# Patient Record
Sex: MALE | Race: WHITE | Employment: OTHER | URBAN - METROPOLITAN AREA
[De-identification: names, ages, dates, MRNs, and addresses within clinical notes are randomized per-mention and may not be internally consistent; named-entity substitution may affect disease eponyms.]

---

## 2018-12-01 ENCOUNTER — APPOINTMENT (OUTPATIENT)
Dept: GENERAL RADIOLOGY | Age: 68
End: 2018-12-01
Payer: MEDICARE

## 2018-12-01 ENCOUNTER — HOSPITAL ENCOUNTER (EMERGENCY)
Age: 68
Discharge: HOME | End: 2018-12-01
Payer: MEDICARE

## 2018-12-01 VITALS
OXYGEN SATURATION: 98 % | HEIGHT: 73 IN | HEART RATE: 68 BPM | DIASTOLIC BLOOD PRESSURE: 80 MMHG | WEIGHT: 232 LBS | SYSTOLIC BLOOD PRESSURE: 146 MMHG | RESPIRATION RATE: 14 BRPM | BODY MASS INDEX: 30.75 KG/M2 | TEMPERATURE: 98 F

## 2018-12-01 DIAGNOSIS — S43.401A SPRAIN OF RIGHT SHOULDER, UNSPECIFIED SHOULDER SPRAIN TYPE, INITIAL ENCOUNTER: Primary | ICD-10-CM

## 2018-12-01 PROCEDURE — 99283 EMERGENCY DEPT VISIT LOW MDM: CPT

## 2018-12-01 PROCEDURE — 73030 X-RAY EXAM OF SHOULDER: CPT

## 2018-12-01 PROCEDURE — 6370000000 HC RX 637 (ALT 250 FOR IP): Performed by: NURSE PRACTITIONER

## 2018-12-01 RX ORDER — PREDNISONE 20 MG/1
TABLET ORAL
Qty: 18 TABLET | Refills: 0 | Status: SHIPPED | OUTPATIENT
Start: 2018-12-01 | End: 2018-12-11

## 2018-12-01 RX ORDER — SIMVASTATIN 40 MG
40 TABLET ORAL NIGHTLY
COMMUNITY
End: 2021-02-01 | Stop reason: SDUPTHER

## 2018-12-01 RX ORDER — IBUPROFEN 800 MG/1
800 TABLET ORAL EVERY 6 HOURS PRN
Qty: 20 TABLET | Refills: 3 | Status: SHIPPED | OUTPATIENT
Start: 2018-12-01 | End: 2022-07-12

## 2018-12-01 RX ORDER — HYDROCODONE BITARTRATE AND ACETAMINOPHEN 5; 325 MG/1; MG/1
1 TABLET ORAL EVERY 6 HOURS PRN
Qty: 12 TABLET | Refills: 0 | Status: SHIPPED | OUTPATIENT
Start: 2018-12-01 | End: 2018-12-04

## 2018-12-01 RX ORDER — IBUPROFEN 800 MG/1
800 TABLET ORAL ONCE
Status: COMPLETED | OUTPATIENT
Start: 2018-12-01 | End: 2018-12-01

## 2018-12-01 RX ORDER — MELOXICAM 10 MG/1
CAPSULE ORAL
COMMUNITY
End: 2021-02-01 | Stop reason: SDUPTHER

## 2018-12-01 RX ADMIN — IBUPROFEN 800 MG: 800 TABLET, FILM COATED ORAL at 11:24

## 2018-12-01 ASSESSMENT — PAIN DESCRIPTION - LOCATION: LOCATION: SHOULDER

## 2018-12-01 ASSESSMENT — PAIN DESCRIPTION - PAIN TYPE: TYPE: ACUTE PAIN

## 2018-12-01 ASSESSMENT — PAIN DESCRIPTION - ORIENTATION: ORIENTATION: RIGHT

## 2018-12-01 ASSESSMENT — PAIN SCALES - GENERAL
PAINLEVEL_OUTOF10: 9
PAINLEVEL_OUTOF10: 9
PAINLEVEL_OUTOF10: 7

## 2018-12-01 ASSESSMENT — PAIN DESCRIPTION - DESCRIPTORS: DESCRIPTORS: CONSTANT;DISCOMFORT

## 2019-12-23 LAB
AVERAGE GLUCOSE: 114
CHOLESTEROL, TOTAL: 178 MG/DL
CHOLESTEROL/HDL RATIO: 3.4
CREATININE: 0.8 MG/DL
HBA1C MFR BLD: 5.6 %
HDLC SERPL-MCNC: 53 MG/DL (ref 35–70)
LDL CHOLESTEROL CALCULATED: 109 MG/DL (ref 0–160)
POTASSIUM (K+): 4.2
TRIGL SERPL-MCNC: 82 MG/DL
VLDLC SERPL CALC-MCNC: 16 MG/DL

## 2020-03-12 VITALS — SYSTOLIC BLOOD PRESSURE: 102 MMHG | DIASTOLIC BLOOD PRESSURE: 60 MMHG | RESPIRATION RATE: 12 BRPM | HEART RATE: 72 BPM

## 2020-03-12 RX ORDER — TERBINAFINE HYDROCHLORIDE 250 MG/1
250 TABLET ORAL DAILY
COMMUNITY
End: 2021-06-16

## 2020-05-04 ENCOUNTER — HOSPITAL ENCOUNTER (OUTPATIENT)
Age: 70
Discharge: HOME | End: 2020-05-06
Payer: MEDICARE

## 2020-05-04 LAB
ALBUMIN SERPL-MCNC: 4.6 G/DL (ref 3.5–5.2)
ALP BLD-CCNC: 63 U/L (ref 40–129)
ALT SERPL-CCNC: 15 U/L (ref 0–40)
ANION GAP SERPL CALCULATED.3IONS-SCNC: 14 MMOL/L (ref 7–16)
AST SERPL-CCNC: 22 U/L (ref 0–39)
BASOPHILS ABSOLUTE: 0.03 E9/L (ref 0–0.2)
BASOPHILS RELATIVE PERCENT: 0.6 % (ref 0–2)
BILIRUB SERPL-MCNC: 0.9 MG/DL (ref 0–1.2)
BUN BLDV-MCNC: 20 MG/DL (ref 8–23)
CALCIUM SERPL-MCNC: 10 MG/DL (ref 8.6–10.2)
CHLORIDE BLD-SCNC: 103 MMOL/L (ref 98–107)
CHOLESTEROL, TOTAL: 178 MG/DL (ref 0–199)
CO2: 26 MMOL/L (ref 22–29)
CREAT SERPL-MCNC: 0.9 MG/DL (ref 0.7–1.2)
EOSINOPHILS ABSOLUTE: 0.22 E9/L (ref 0.05–0.5)
EOSINOPHILS RELATIVE PERCENT: 4.3 % (ref 0–6)
GFR AFRICAN AMERICAN: >60
GFR NON-AFRICAN AMERICAN: >60 ML/MIN/1.73
GLUCOSE BLD-MCNC: 97 MG/DL (ref 74–99)
HCT VFR BLD CALC: 48.7 % (ref 37–54)
HDLC SERPL-MCNC: 54 MG/DL
HEMOGLOBIN: 16.1 G/DL (ref 12.5–16.5)
IMMATURE GRANULOCYTES #: 0.01 E9/L
IMMATURE GRANULOCYTES %: 0.2 % (ref 0–5)
LDL CHOLESTEROL CALCULATED: 110 MG/DL (ref 0–99)
LYMPHOCYTES ABSOLUTE: 1.18 E9/L (ref 1.5–4)
LYMPHOCYTES RELATIVE PERCENT: 23 % (ref 20–42)
MCH RBC QN AUTO: 29.8 PG (ref 26–35)
MCHC RBC AUTO-ENTMCNC: 33.1 % (ref 32–34.5)
MCV RBC AUTO: 90 FL (ref 80–99.9)
MONOCYTES ABSOLUTE: 0.44 E9/L (ref 0.1–0.95)
MONOCYTES RELATIVE PERCENT: 8.6 % (ref 2–12)
NEUTROPHILS ABSOLUTE: 3.25 E9/L (ref 1.8–7.3)
NEUTROPHILS RELATIVE PERCENT: 63.3 % (ref 43–80)
PDW BLD-RTO: 12.3 FL (ref 11.5–15)
PLATELET # BLD: 206 E9/L (ref 130–450)
PMV BLD AUTO: 11 FL (ref 7–12)
POTASSIUM SERPL-SCNC: 4.7 MMOL/L (ref 3.5–5)
PROSTATE SPECIFIC ANTIGEN: <0.01 NG/ML (ref 0–4)
RBC # BLD: 5.41 E12/L (ref 3.8–5.8)
SODIUM BLD-SCNC: 143 MMOL/L (ref 132–146)
TOTAL PROTEIN: 8 G/DL (ref 6.4–8.3)
TRIGL SERPL-MCNC: 69 MG/DL (ref 0–149)
VLDLC SERPL CALC-MCNC: 14 MG/DL
WBC # BLD: 5.1 E9/L (ref 4.5–11.5)

## 2020-05-04 PROCEDURE — 84153 ASSAY OF PSA TOTAL: CPT

## 2020-05-04 PROCEDURE — 80053 COMPREHEN METABOLIC PANEL: CPT

## 2020-05-04 PROCEDURE — 36415 COLL VENOUS BLD VENIPUNCTURE: CPT

## 2020-05-04 PROCEDURE — 80061 LIPID PANEL: CPT

## 2020-05-04 PROCEDURE — 85025 COMPLETE CBC W/AUTO DIFF WBC: CPT

## 2020-06-04 VITALS
HEART RATE: 70 BPM | BODY MASS INDEX: 30.61 KG/M2 | TEMPERATURE: 96.4 F | SYSTOLIC BLOOD PRESSURE: 110 MMHG | WEIGHT: 232 LBS | RESPIRATION RATE: 14 BRPM | DIASTOLIC BLOOD PRESSURE: 72 MMHG

## 2020-08-31 VITALS
SYSTOLIC BLOOD PRESSURE: 127 MMHG | RESPIRATION RATE: 14 BRPM | TEMPERATURE: 95.9 F | DIASTOLIC BLOOD PRESSURE: 70 MMHG | BODY MASS INDEX: 30.34 KG/M2 | WEIGHT: 230 LBS | HEART RATE: 64 BPM

## 2020-10-14 VITALS
HEART RATE: 72 BPM | SYSTOLIC BLOOD PRESSURE: 120 MMHG | TEMPERATURE: 96.6 F | BODY MASS INDEX: 31.14 KG/M2 | DIASTOLIC BLOOD PRESSURE: 72 MMHG | WEIGHT: 236 LBS | RESPIRATION RATE: 14 BRPM

## 2020-12-23 ENCOUNTER — TELEPHONE (OUTPATIENT)
Dept: FAMILY MEDICINE CLINIC | Age: 70
End: 2020-12-23

## 2020-12-23 ENCOUNTER — TELEPHONE (OUTPATIENT)
Dept: ADMINISTRATIVE | Age: 70
End: 2020-12-23

## 2020-12-23 NOTE — TELEPHONE ENCOUNTER
Pt would like a call from the office concerning bw that he will need for an upcoming appt in [facility] [employer] .  Please call Pt

## 2020-12-30 DIAGNOSIS — C61 PROSTATE CANCER (HCC): ICD-10-CM

## 2020-12-30 LAB — PROSTATE SPECIFIC ANTIGEN: <0.03 NG/ML (ref 0–4)

## 2021-02-01 RX ORDER — SIMVASTATIN 40 MG
40 TABLET ORAL NIGHTLY
Qty: 90 TABLET | Refills: 1 | Status: SHIPPED
Start: 2021-02-01 | End: 2021-07-30 | Stop reason: SDUPTHER

## 2021-02-01 RX ORDER — MELOXICAM 10 MG/1
15 CAPSULE ORAL DAILY
Qty: 90 CAPSULE | Refills: 1 | Status: SHIPPED | OUTPATIENT
Start: 2021-02-01 | End: 2022-07-12 | Stop reason: SDUPTHER

## 2021-06-16 ENCOUNTER — OFFICE VISIT (OUTPATIENT)
Dept: FAMILY MEDICINE CLINIC | Age: 71
End: 2021-06-16
Payer: MEDICARE

## 2021-06-16 VITALS
WEIGHT: 230 LBS | HEART RATE: 64 BPM | SYSTOLIC BLOOD PRESSURE: 130 MMHG | BODY MASS INDEX: 30.48 KG/M2 | DIASTOLIC BLOOD PRESSURE: 80 MMHG | OXYGEN SATURATION: 95 % | HEIGHT: 73 IN

## 2021-06-16 DIAGNOSIS — R73.9 HYPERGLYCEMIA: ICD-10-CM

## 2021-06-16 DIAGNOSIS — E78.5 HYPERLIPIDEMIA, UNSPECIFIED HYPERLIPIDEMIA TYPE: ICD-10-CM

## 2021-06-16 DIAGNOSIS — M79.18 GLUTEAL PAIN: ICD-10-CM

## 2021-06-16 DIAGNOSIS — M25.551 HIP PAIN, ACUTE, RIGHT: Primary | ICD-10-CM

## 2021-06-16 DIAGNOSIS — Z85.46 HISTORY OF PROSTATE CANCER: ICD-10-CM

## 2021-06-16 DIAGNOSIS — C43.9 MALIGNANT MELANOMA, UNSPECIFIED SITE (HCC): ICD-10-CM

## 2021-06-16 PROCEDURE — 1123F ACP DISCUSS/DSCN MKR DOCD: CPT | Performed by: INTERNAL MEDICINE

## 2021-06-16 PROCEDURE — 4004F PT TOBACCO SCREEN RCVD TLK: CPT | Performed by: INTERNAL MEDICINE

## 2021-06-16 PROCEDURE — 99213 OFFICE O/P EST LOW 20 MIN: CPT | Performed by: INTERNAL MEDICINE

## 2021-06-16 PROCEDURE — 4040F PNEUMOC VAC/ADMIN/RCVD: CPT | Performed by: INTERNAL MEDICINE

## 2021-06-16 PROCEDURE — G8417 CALC BMI ABV UP PARAM F/U: HCPCS | Performed by: INTERNAL MEDICINE

## 2021-06-16 PROCEDURE — 3017F COLORECTAL CA SCREEN DOC REV: CPT | Performed by: INTERNAL MEDICINE

## 2021-06-16 PROCEDURE — G8427 DOCREV CUR MEDS BY ELIG CLIN: HCPCS | Performed by: INTERNAL MEDICINE

## 2021-06-16 RX ORDER — METHYLPREDNISOLONE 4 MG/1
TABLET ORAL
Qty: 1 KIT | Refills: 0 | Status: SHIPPED | OUTPATIENT
Start: 2021-06-16 | End: 2021-06-22

## 2021-06-16 SDOH — ECONOMIC STABILITY: FOOD INSECURITY: WITHIN THE PAST 12 MONTHS, YOU WORRIED THAT YOUR FOOD WOULD RUN OUT BEFORE YOU GOT MONEY TO BUY MORE.: NEVER TRUE

## 2021-06-16 SDOH — ECONOMIC STABILITY: FOOD INSECURITY: WITHIN THE PAST 12 MONTHS, THE FOOD YOU BOUGHT JUST DIDN'T LAST AND YOU DIDN'T HAVE MONEY TO GET MORE.: NEVER TRUE

## 2021-06-16 ASSESSMENT — PATIENT HEALTH QUESTIONNAIRE - PHQ9
SUM OF ALL RESPONSES TO PHQ QUESTIONS 1-9: 0
2. FEELING DOWN, DEPRESSED OR HOPELESS: 0
SUM OF ALL RESPONSES TO PHQ QUESTIONS 1-9: 0
SUM OF ALL RESPONSES TO PHQ QUESTIONS 1-9: 0
SUM OF ALL RESPONSES TO PHQ9 QUESTIONS 1 & 2: 0
1. LITTLE INTEREST OR PLEASURE IN DOING THINGS: 0

## 2021-06-16 ASSESSMENT — SOCIAL DETERMINANTS OF HEALTH (SDOH): HOW HARD IS IT FOR YOU TO PAY FOR THE VERY BASICS LIKE FOOD, HOUSING, MEDICAL CARE, AND HEATING?: NOT HARD AT ALL

## 2021-06-16 NOTE — PROGRESS NOTES
Subjective:     Chief Complaint   Patient presents with    Melanoma   Patient here complaining of pain in the right gluteal area for the past 6 months, it hurts to sit, sitting in the chair he cannot sit for more than 20 minutes, driving a car after sometimes gets uncomfortable, pain is mostly localized to the right gluteal area it does not radiate to the leg  Denies any groin pain      Recently he was diagnosed with melanoma he underwent surgery in the right scapular area now he follows at [facility] he sees Dr. Tio Ponce  For the melanoma  He had a CT chest and CT abdomen pelvis in January 2021    Getting another CAT scan May 2021      Has generalized arthritis    Has history of prostate cancer    Past Medical History:   Diagnosis Date    Allergic rhinitis     Cancer ([facility]     Gout     Hyperlipidemia     Onychomycosis     Osteoarthritis     Prostate CA ([facility]     PVC (premature ventricular contraction)     Varicose vein of leg         Social History     Socioeconomic History    Marital status:      Spouse name: Not on file    Number of children: Not on file    Years of education: Not on file    Highest education level: Not on file   Occupational History    Not on file   Tobacco Use    Smoking status: Never Smoker    Smokeless tobacco: Never Used   Substance and Sexual Activity    Alcohol use: No    Drug use: No    Sexual activity: Not on file   Other Topics Concern    Not on file   Social History Narrative    Not on file     Social Determinants of Health     Financial Resource Strain: Low Risk     Difficulty of Paying Living Expenses: Not hard at all   Food Insecurity: No Food Insecurity    Worried About [address] [facility] in the Last Year: Never true    [address]  in the Last Year: Never true   Transportation Needs:     Lack of Transportation (Medical):      Lack of Transportation (Non-Medical):    Physical Activity:     Days of Exercise per Week:     Minutes of Exercise per Session:    Stress:     Feeling of Stress :    Social Connections:     Frequency of Communication with Friends and Family:     Frequency of Social Gatherings with Friends and Family:     Attends [employer] Services:     Active Member of Clubs or Organizations:     Attends Club or Organization Meetings:     Marital Status:    Intimate Partner Violence:     Fear of Current or Ex-Partner:     Emotionally Abused:     Physically Abused:     Sexually Abused:         Past Surgical History:   Procedure Laterality Date   [facility]    Dr. Radha Willingham     COLONOSCOPY  2009    another in  Oct. 2017    HERNIA REPAIR      KNEE ARTHROPLASTY Bilateral     LUMBAR LAMINECTOMY      L4    TONSILLECTOMY          Family History   Problem Relation Age of Onset    Breast Cancer Mother     Arthritis Father     High Blood Pressure Father     No Known Problems Sister         Allergies   Allergen Reactions    Sulfa Antibiotics     Tetanus Toxoid Other (See Comments)     Horse serum        ROS  No acute distress  Cardiac: Denies any chest pain or palpitation  Stress test 2004 -ve  Denies any exertional angina physically very active  Respiratory: Denies any cough or shortness of breath  CT chest January 2021 [facility]  GI: No abdominal pain. Denies any nausea vomiting or diarrhea  Colonoscopy October 2017  CT abdomen pelvis January 2021 -  : Denies any dysuria frequency or hematuria  History of prostate cancer not following with any urologist at this time  Neuro: No headache or dizziness  Mild peripheral neuropathy stable has been seen by neurologist  Endocrine: No diabetes  Skin: normal  No recent weight gain or weight loss  Denies any change in vision    Objective:    /80   Pulse 64   Ht 6' 1\" (1.854 m)   Wt 230 lb (104.3 kg)   SpO2 95%   BMI 30.34 kg/m²     Constitutional: Alert awake and oriented  Eyes: Pupils equal bilaterally.  Extraocular muscles intact  Neck: no JVD adenopathy no bruit  Heart:  RRR, no murmurs, gallops, or rubs. Lungs:    no wheeze, rales or rhonchi  Abd: bowel sounds present, nontender, nondistended, no masses  Extrem:  No clubbing, cyanosis, or edema  Neuro: AAOx3,No Focal deficit  Psychological: no depression or anxiety   He had laminectomy in 1989 L4-L5  No bony tenderness  Reproducible tenderness near the right greater trochanter    Current Outpatient Medications   Medication Sig Dispense Refill    methylPREDNISolone (MEDROL DOSEPACK) 4 MG tablet Take by mouth. 1 kit 0    simvastatin (ZOCOR) 40 MG tablet Take 1 tablet by mouth nightly 90 tablet 1    Meloxicam 10 MG CAPS Take 15 mg/day by mouth daily 90 capsule 1    terbinafine (LAMISIL) 250 MG tablet Take 250 mg by mouth daily (Patient not taking: Reported on 6/16/2021)      ibuprofen (ADVIL;MOTRIN) 800 MG tablet Take 1 tablet by mouth every 6 hours as needed for Pain 20 tablet 3     No current facility-administered medications for this visit.         Last 3 BMP  Lab Results   Component Value Date/Time     12/29/2020 09:52 AM     05/04/2020 10:05 AM     03/25/2016 09:44 AM    K 4.5 12/29/2020 09:52 AM    K 4.7 05/04/2020 10:05 AM    K 4.2 12/23/2019 12:00 AM    K 4.3 03/25/2016 09:44 AM     12/29/2020 09:52 AM     05/04/2020 10:05 AM     03/25/2016 09:44 AM    CO2 26 05/04/2020 10:05 AM    CO2 25 03/25/2016 09:44 AM    CO2 26 07/22/2015 09:16 AM    BUN 20 05/04/2020 10:05 AM    BUN 20 03/25/2016 09:44 AM    BUN 21 07/22/2015 09:16 AM    CREATININE 0.6 05/20/2021 09:46 AM    CREATININE 0.74 05/20/2021 09:16 AM    CREATININE 0.84 12/29/2020 09:52 AM    CREATININE 0.9 05/04/2020 10:05 AM    CREATININE 0.8 12/23/2019 12:00 AM    GLUCOSE 96 12/29/2020 09:52 AM    GLUCOSE 97 05/04/2020 10:05 AM    GLUCOSE 99 03/25/2016 09:44 AM    GLUCOSE 98 07/22/2015 09:16 AM    CALCIUM 9.6 12/29/2020 09:52 AM    CALCIUM 10.0 05/04/2020 10:05 AM    CALCIUM 9.2 03/25/2016 09:44 AM Last 3 CMP:    Lab Results   Component Value Date/Time     12/29/2020 09:52 AM     05/04/2020 10:05 AM     03/25/2016 09:44 AM    K 4.5 12/29/2020 09:52 AM    K 4.7 05/04/2020 10:05 AM    K 4.2 12/23/2019 12:00 AM    K 4.3 03/25/2016 09:44 AM     12/29/2020 09:52 AM     05/04/2020 10:05 AM     03/25/2016 09:44 AM    CO2 26 05/04/2020 10:05 AM    CO2 25 03/25/2016 09:44 AM    CO2 26 07/22/2015 09:16 AM    BUN 20 05/04/2020 10:05 AM    BUN 20 03/25/2016 09:44 AM    BUN 21 07/22/2015 09:16 AM    CREATININE 0.6 05/20/2021 09:46 AM    CREATININE 0.74 05/20/2021 09:16 AM    CREATININE 0.84 12/29/2020 09:52 AM    CREATININE 0.9 05/04/2020 10:05 AM    CREATININE 0.8 12/23/2019 12:00 AM    GLUCOSE 96 12/29/2020 09:52 AM    GLUCOSE 97 05/04/2020 10:05 AM    GLUCOSE 99 03/25/2016 09:44 AM    GLUCOSE 98 07/22/2015 09:16 AM    CALCIUM 9.6 12/29/2020 09:52 AM    CALCIUM 10.0 05/04/2020 10:05 AM    CALCIUM 9.2 03/25/2016 09:44 AM    PROT 8.0 05/04/2020 10:05 AM    PROT 7.3 03/25/2016 09:44 AM    PROT 7.4 07/22/2015 09:16 AM    LABALBU 4.6 05/04/2020 10:05 AM    LABALBU 4.1 03/25/2016 09:44 AM    LABALBU 4.2 07/22/2015 09:16 AM    LABALBU 4.4 12/12/2011 08:49 AM    BILITOT 0.9 05/04/2020 10:05 AM    BILITOT 0.7 03/25/2016 09:44 AM    BILITOT 0.6 07/22/2015 09:16 AM    ALKPHOS 63 05/04/2020 10:05 AM    ALKPHOS 54 03/25/2016 09:44 AM    ALKPHOS 52 07/22/2015 09:16 AM    AST 22 05/04/2020 10:05 AM    AST 21 03/25/2016 09:44 AM    AST 21 07/22/2015 09:16 AM    ALT 15 05/04/2020 10:05 AM    ALT 13 03/25/2016 09:44 AM    ALT 14 07/22/2015 09:16 AM        CBC:   Lab Results   Component Value Date/Time    WBC 5.1 12/29/2020 09:52 AM    WBC 5.1 05/04/2020 10:05 AM    RBC 5.19 12/29/2020 09:52 AM    HGB 15.8 12/29/2020 09:52 AM    HCT 45.1 12/29/2020 09:52 AM    MCV 87 12/29/2020 09:52 AM    MCH 29.8 05/04/2020 10:05 AM    MCHC 35.0 12/29/2020 09:52 AM    RDW 12.3 05/04/2020 10:05 AM     12/29/2020 09:52 AM    MPV 11.0 05/04/2020 10:05 AM       A1C:  Lab Results   Component Value Date/Time    LABA1C 5.6 12/23/2019 12:00 AM       Lipid panel:  Lab Results   Component Value Date    CHOL 178 05/04/2020    CHOL 178 12/23/2019    CHOL 236 03/25/2016    TRIG 69 05/04/2020    TRIG 82 12/23/2019    TRIG 94 03/25/2016    HDL 54 05/04/2020    HDL 53 12/23/2019    HDL 51 03/25/2016        Lab Results   Component Value Date/Time    PROT 8.0 05/04/2020 10:05 AM    PROT 7.3 03/25/2016 09:44 AM    PROT 7.4 07/22/2015 09:16 AM    INR 1.3 (H) 12/29/2020 09:52 AM       No results found for: MG      [facility]. Diagnoses and all orders for this visit:    Hip pain, acute, right  -     XR PELVIS (1-2 VIEWS); Future  -     XR HIP RIGHT (2-3 VIEWS); Future  -     CBC WITH AUTO DIFFERENTIAL; Future  -     PSA, DIAGNOSTIC; Future    Gluteal pain  -     XR PELVIS (1-2 VIEWS); Future  -     XR HIP RIGHT (2-3 VIEWS); Future  -     CBC WITH AUTO DIFFERENTIAL; Future  -     PSA, DIAGNOSTIC; Future    History of prostate cancer  -     XR PELVIS (1-2 VIEWS); Future  -     XR HIP RIGHT (2-3 VIEWS); Future  -     CBC WITH AUTO DIFFERENTIAL; Future  -     PSA, DIAGNOSTIC; Future    Hyperlipidemia, unspecified hyperlipidemia type  -     COMPREHENSIVE METABOLIC PANEL; Future  -     LIPID PANEL; Future  -     TSH; Future    Hyperglycemia  -     HEMOGLOBIN A1C; Future    Malignant melanoma, unspecified site ([address]    Other orders  -     methylPREDNISolone (MEDROL DOSEPACK) 4 MG tablet; Take by mouth.        Patient Active Problem List   Diagnosis    Sprain of shoulder, right    Malignant melanoma ([address]    History of prostate cancer    Hyperlipidemia       Plan: Right hip pain and gluteal pain x-ray of the pelvis of the right hip    Medrol Dosepak for possible bursitis, if no improvement see an orthopedic surgeon he will see 1 in [facility] he will discuss with his doctor in [place] who

## 2021-07-30 RX ORDER — MELOXICAM 15 MG/1
15 TABLET ORAL DAILY PRN
Qty: 90 TABLET | Refills: 1 | Status: SHIPPED
Start: 2021-07-30 | End: 2022-01-03 | Stop reason: SDUPTHER

## 2021-07-30 RX ORDER — SIMVASTATIN 40 MG
40 TABLET ORAL NIGHTLY
Qty: 90 TABLET | Refills: 3 | Status: SHIPPED | OUTPATIENT
Start: 2021-07-30 | End: 2022-05-31 | Stop reason: SDUPTHER

## 2021-10-04 ENCOUNTER — OFFICE VISIT (OUTPATIENT)
Dept: FAMILY MEDICINE CLINIC | Age: 71
End: 2021-10-04
Payer: MEDICARE

## 2021-10-04 VITALS
WEIGHT: 233 LBS | HEART RATE: 71 BPM | DIASTOLIC BLOOD PRESSURE: 80 MMHG | OXYGEN SATURATION: 98 % | HEIGHT: 73 IN | TEMPERATURE: 97.3 F | BODY MASS INDEX: 30.88 KG/M2 | SYSTOLIC BLOOD PRESSURE: 128 MMHG

## 2021-10-04 DIAGNOSIS — D72.819 LEUKOPENIA, UNSPECIFIED TYPE: ICD-10-CM

## 2021-10-04 DIAGNOSIS — Z12.11 COLON CANCER SCREENING: ICD-10-CM

## 2021-10-04 DIAGNOSIS — M19.90 INFLAMMATORY ARTHRITIS: ICD-10-CM

## 2021-10-04 DIAGNOSIS — Z23 NEED FOR INFLUENZA VACCINATION: ICD-10-CM

## 2021-10-04 DIAGNOSIS — C43.9 MALIGNANT MELANOMA, UNSPECIFIED SITE (HCC): ICD-10-CM

## 2021-10-04 DIAGNOSIS — Z00.00 ROUTINE GENERAL MEDICAL EXAMINATION AT A HEALTH CARE FACILITY: Primary | ICD-10-CM

## 2021-10-04 DIAGNOSIS — C61 PROSTATE CANCER (HCC): ICD-10-CM

## 2021-10-04 DIAGNOSIS — E78.5 HYPERLIPIDEMIA, UNSPECIFIED HYPERLIPIDEMIA TYPE: ICD-10-CM

## 2021-10-04 PROCEDURE — G0008 ADMIN INFLUENZA VIRUS VAC: HCPCS | Performed by: INTERNAL MEDICINE

## 2021-10-04 PROCEDURE — G8427 DOCREV CUR MEDS BY ELIG CLIN: HCPCS | Performed by: INTERNAL MEDICINE

## 2021-10-04 PROCEDURE — 4040F PNEUMOC VAC/ADMIN/RCVD: CPT | Performed by: INTERNAL MEDICINE

## 2021-10-04 PROCEDURE — 90694 VACC AIIV4 NO PRSRV 0.5ML IM: CPT | Performed by: INTERNAL MEDICINE

## 2021-10-04 PROCEDURE — G8484 FLU IMMUNIZE NO ADMIN: HCPCS | Performed by: INTERNAL MEDICINE

## 2021-10-04 PROCEDURE — 1123F ACP DISCUSS/DSCN MKR DOCD: CPT | Performed by: INTERNAL MEDICINE

## 2021-10-04 PROCEDURE — 4004F PT TOBACCO SCREEN RCVD TLK: CPT | Performed by: INTERNAL MEDICINE

## 2021-10-04 PROCEDURE — G0438 PPPS, INITIAL VISIT: HCPCS | Performed by: INTERNAL MEDICINE

## 2021-10-04 PROCEDURE — G8417 CALC BMI ABV UP PARAM F/U: HCPCS | Performed by: INTERNAL MEDICINE

## 2021-10-04 PROCEDURE — 3017F COLORECTAL CA SCREEN DOC REV: CPT | Performed by: INTERNAL MEDICINE

## 2021-10-04 PROCEDURE — 99214 OFFICE O/P EST MOD 30 MIN: CPT | Performed by: INTERNAL MEDICINE

## 2021-10-04 ASSESSMENT — LIFESTYLE VARIABLES
AUDIT TOTAL SCORE: INCOMPLETE
HOW OFTEN DO YOU HAVE A DRINK CONTAINING ALCOHOL: 0
AUDIT-C TOTAL SCORE: INCOMPLETE
HOW OFTEN DO YOU HAVE A DRINK CONTAINING ALCOHOL: NEVER

## 2021-10-04 ASSESSMENT — PATIENT HEALTH QUESTIONNAIRE - PHQ9
SUM OF ALL RESPONSES TO PHQ9 QUESTIONS 1 & 2: 0
SUM OF ALL RESPONSES TO PHQ QUESTIONS 1-9: 0
2. FEELING DOWN, DEPRESSED OR HOPELESS: 0
SUM OF ALL RESPONSES TO PHQ QUESTIONS 1-9: 0
SUM OF ALL RESPONSES TO PHQ QUESTIONS 1-9: 0
1. LITTLE INTEREST OR PLEASURE IN DOING THINGS: 0

## 2021-10-04 NOTE — PATIENT INSTRUCTIONS
Personalized Preventive Plan for Chuck Marsh - 10/4/2021  Medicare offers a range of preventive health benefits. Some of the tests and screenings are paid in full while other may be subject to a deductible, co-insurance, and/or copay. Some of these benefits include a comprehensive review of your medical history including lifestyle, illnesses that may run in your family, and various assessments and screenings as appropriate. After reviewing your medical record and screening and assessments performed today your provider may have ordered immunizations, labs, imaging, and/or referrals for you. A list of these orders (if applicable) as well as your Preventive Care list are included within your After Visit Summary for your review. Other Preventive Recommendations:    · A preventive eye exam performed by an eye specialist is recommended every 1-2 years to screen for glaucoma; cataracts, macular degeneration, and other eye disorders. · A preventive dental visit is recommended every 6 months. · Try to get at least 150 minutes of exercise per week or 10,000 steps per day on a pedometer . · Order or download the FREE \"Exercise & Physical Activity: Your Everyday Guide\" from [employer]. Call 4-673.539.6319 or search [employer] online. · You need 8216-0108 mg of calcium and 9578-0428 IU of vitamin D per day. It is possible to meet your calcium requirement with diet alone, but a vitamin D supplement is usually necessary to meet this goal.  · When exposed to the sun, use a sunscreen that protects against both UVA and UVB radiation with an SPF of 30 or greater. Reapply every 2 to 3 hours or after sweating, drying off with a towel, or swimming. · Always wear a seat belt when traveling in a car. Always wear a helmet when riding a bicycle or motorcycle.

## 2021-10-04 NOTE — PROGRESS NOTES
Medicare Annual Wellness Visit  Name: Mae Moses Date: 10/4/2021   MRN: <[id number]> Sex: Male   Age: 73 y.o. Ethnicity: Non- / Non    : 1950 Race: White (non-)      Rick Sinha is here for Annual Exam (Medicare)    Screenings for behavioral, psychosocial and functional/safety risks, and cognitive dysfunction are all negative except as indicated below. These results, as well as other patient data from the [address] form, are documented in Flowsheets linked to this Encounter. Allergies   Allergen Reactions    Sulfa Antibiotics     Tetanus Toxoid Other (See Comments)     Horse serum         Prior to Visit Medications    Medication Sig Taking?  Authorizing Provider   meloxicam (MOBIC) 15 MG tablet Take 1 tablet by mouth daily as needed for Pain Yes Genoveva Araujo MD   simvastatin (ZOCOR) 40 MG tablet Take 1 tablet by mouth nightly Yes Genoveva Araujo MD   Meloxicam 10 MG CAPS Take 15 mg/day by mouth daily Yes Genoveva Araujo MD   ibuprofen (ADVIL;MOTRIN) 800 MG tablet Take 1 tablet by mouth every 6 hours as needed for Pain  Lopez Reed, APRN - CNP         Past Medical History:   Diagnosis Date    Allergic rhinitis     Cancer [facility] [address]     Gout     Hyperlipidemia     Onychomycosis     Osteoarthritis     Prostate CA ([facility] [address]     PVC (premature ventricular contraction)     Varicose vein of leg        Past Surgical History:   Procedure Laterality Date   [facility]    Dr. Tyson Morton     COLONOSCOPY      another in  Oct. 2017    HERNIA REPAIR      KNEE ARTHROPLASTY Bilateral     LUMBAR LAMINECTOMY      L4    TONSILLECTOMY           Family History   Problem Relation Age of Onset    Breast Cancer Mother     Arthritis Father     High Blood Pressure Father     No Known Problems Sister        CareTeam (Including outside providers/suppliers regularly involved in providing care):   Patient Care Team:  Genoveva Araujo MD as PCP - General (Internal Medicine)  Jose Armando Pineda MD as PCP - [facility] Empaneled Provider    Wt Readings from Last 3 Encounters:   10/04/21 233 lb (105.7 kg)   06/16/21 230 lb (104.3 kg)   10/08/20 236 lb (107 kg)     Vitals:    10/04/21 1113   BP: 128/80   Pulse: 71   Temp: 97.3 °F (36.3 °C)   SpO2: 98%   Weight: 233 lb (105.7 kg)   Height: 6' 1\" (1.854 m)     Body mass index is 30.74 kg/m². Based upon direct observation of the patient, evaluation of cognition reveals recent and remote memory intact. See detailed physical examination    Patient's complete Health Risk Assessment and screening values have been reviewed and are found in Flowsheets. The following problems were reviewed today and where indicated follow up appointments were made and/or referrals ordered. Positive Risk Factor Screenings with Interventions:            General Health and ACP:  General  In general, how would you say your health is?: Good  In the past 7 days, have you experienced any of the following?  New or Increased Pain, New or Increased Fatigue, Loneliness, Social Isolation, Stress or Anger?: None of These  Do you get the social and emotional support that you need?: Yes  Do you have a Living Will?: Yes  Advance Directives     Power of [address] [place] [address] Will ACP-Advance Directive ACP-Power of     Not on File Not on File Not on File Not on File      General Health Risk Interventions:  · Generalized arthritis pain, no depression anxiety, feels well, active, plays golf, plays racquetball,    Health Habits/Nutrition:  Health Habits/Nutrition  Do you exercise for at least 20 minutes 2-3 times per week?: Yes  Have you lost any weight without trying in the past 3 months?: No  Do you eat only one meal per day?: No  Have you seen the dentist within the past year?: Yes  Body mass index: (!) 30.74  Health Habits/Nutrition Interventions:  · Physically active, plays racquetball, plays golf,       Personalized Preventive Plan   Current Health Maintenance Status  Immunization History   Administered Date(s) Administered    COVID-19, Pfizer, PF, 30mcg/0.3mL 02/02/2021, 02/23/2021    Influenza, High Dose (Fluzone 65 yrs and older) 10/01/2020    Influenza, Quadv, adjuvanted, 65 yrs +, IM, PF (Fluad) 09/11/2020, 10/04/2021    Pneumococcal Conjugate 7-valent (Tio Magana) 1950    Zoster Live (Zostavax) 04/12/2013        Health Maintenance   Topic Date Due    Hepatitis C screen  Never done    Shingles Vaccine (2 of 3) 06/07/2013    Pneumococcal 65+ years Vaccine (1 of 1 - PPSV23) 08/09/2017    Annual Wellness Visit (AWV)  Never done    Lipid screen  09/29/2022    PSA counseling  09/29/2022    Diabetes screen  09/29/2024    Colon cancer screen colonoscopy  10/30/2027    Flu vaccine  Completed    COVID-19 Vaccine  Completed    Hepatitis A vaccine  Aged Out    Hepatitis B vaccine  Aged Out    Hib vaccine  Aged Out    Meningococcal (ACWY) vaccine  Aged Out     Recommendations for [employer] Due: see orders and patient instructions/AVS.  . Recommended screening schedule for the next 5-10 years is provided to the patient in written form: see Patient Instructions/AVS.    Jose A Talbot was seen today for annual exam.    Diagnoses and all orders for this visit:    Routine general medical examination at a health care facility    Malignant melanoma, unspecified site [facility])    Need for influenza vaccination  -     INFLUENZA, QUADV, ADJUVANTED, 72 YRS =, IM, PF, PREFILL SYR, 0.5ML (FLUAD)    Leukopenia, unspecified type  -     CBC WITH AUTO DIFFERENTIAL; Future    Prostate cancer ([facility] [address]  -     CBC WITH AUTO DIFFERENTIAL; Future    Inflammatory arthritis  -     CBC WITH AUTO DIFFERENTIAL; Future  -     C-REACTIVE PROTEIN; Future  -     RHEUMATOID FACTOR; Future  -     ROCIO; Future  -     Sedimentation Rate; Future    Hyperlipidemia, unspecified hyperlipidemia type  -     COMPREHENSIVE METABOLIC PANEL; Future  -     LIPID PANEL;  Future Subjective:     Chief Complaint   Patient presents with    Annual Exam     Medicare   Patient is here for wellness physical examination, complains of generalized hand pains, stiff in the morning,  He was checked for arthritis profile few years ago which was negative, she has been taking meloxicam with good help,    She has history of melanoma now, which is being followed by a specialist surgeon was to hospital,  He had a CT abdomen pelvis and CT chest May 2021 was okay,    History of prostate cancer has been stable his PSA has been good      Follow-up on the cholesterol, a complete physical performed    His pain in the right hip is improved, x-ray of the pelvis and hip in June 2021 was negative    Past Medical History:   Diagnosis Date    Allergic rhinitis     Cancer [facility] [address]     Gout     Hyperlipidemia     Onychomycosis     Osteoarthritis     Prostate CA ([facility] [address]     PVC (premature ventricular contraction)     Varicose vein of leg         Social History     Socioeconomic History    Marital status:      Spouse name: Not on file    Number of children: Not on file    Years of education: Not on file    Highest education level: Not on file   Occupational History    Not on file   Tobacco Use    Smoking status: Never Smoker    Smokeless tobacco: Never Used   Substance and Sexual Activity    Alcohol use: No    Drug use: No    Sexual activity: Not on file   Other Topics Concern    Not on file   Social History Narrative    Not on file     Social Determinants of Health     Financial Resource Strain: Low Risk     Difficulty of Paying Living Expenses: Not hard at all   Food Insecurity: No Food Insecurity    Worried About [address] in the Last Year: Never true    [address]  in the Last Year: Never true   Transportation Needs:     Lack of Transportation (Medical):      Lack of Transportation (Non-Medical):    Physical Activity:     Days of Exercise per Week:     Minutes of Exercise per Session:    Stress:     Feeling of Stress :    Social Connections:     Frequency of Communication with Friends and Family:     Frequency of Social Gatherings with Friends and Family:     Attends [employer] Services:     Active Member of Clubs or Organizations:     Attends Club or Organization Meetings:     Marital Status:    Intimate Partner Violence:     Fear of Current or Ex-Partner:     Emotionally Abused:     Physically Abused:     Sexually Abused:         Past Surgical History:   Procedure Laterality Date   Nikki Bautista     COLONOSCOPY  2009    another in  Oct. 2017    HERNIA REPAIR      KNEE ARTHROPLASTY Bilateral     LUMBAR LAMINECTOMY      L4    TONSILLECTOMY          Family History   Problem Relation Age of Onset    Breast Cancer Mother     Arthritis Father     High Blood Pressure Father     No Known Problems Sister         Allergies   Allergen Reactions    Sulfa Antibiotics     Tetanus Toxoid Other (See Comments)     Horse serum        ROS  No acute distress  Cardiac: Denies any chest pain or palpitation  Stress test 2004 -  He plays racquetball 2 hours/day 3 to 4 days a week without any angina  Plays golf regularly without any dyspnea or angina  Respiratory: Denies any cough or shortness of breath  GI: No abdominal pain.  Denies any nausea vomiting or diarrhea  CT abdomen pelvis May 2021 [facility] negative  Colonoscopy October 2017 by Dr. Chela Chavira  He recommended follow-up in 10 years unless symptomatic  Patient will check with him for follow-up colonoscopy  : Denies any dysuria frequency or hematuria  History of prostate cancer  He does not go to urologist anymore  Monitoring his PSA PSA at this noted  Neuro: No headache or dizziness  History of mild peripheral neuropathy has been seen by neurologist stable no worsening of symptoms  Endocrine: No diabetes  Skin: normal  No recent weight gain or weight loss  Denies any change in vision    Objective:    /80   Pulse 71   Temp 97.3 °F (36.3 °C)   Ht 6' 1\" (1.854 m)   Wt 233 lb (105.7 kg)   SpO2 98%   BMI 30.74 kg/m²     Constitutional: Alert awake and oriented  Eyes: Pupils equal bilaterally. Extraocular muscles intact  Neck: no JVD adenopathy no bruit  Heart:  RRR, no murmurs, gallops, or rubs. Lungs:    no wheeze, rales or rhonchi  Abd: bowel sounds present, nontender, nondistended, no masses  Extrem:  No clubbing, cyanosis, or edema  Neuro: AAOx3,No Focal deficit  Psychological: no depression or anxiety   Rectal exam no masses stool Hemoccult negative  Had laminectomy in 1989 L4-L5  Had melanoma excision    Current Outpatient Medications   Medication Sig Dispense Refill    meloxicam (MOBIC) 15 MG tablet Take 1 tablet by mouth daily as needed for Pain 90 tablet 1    simvastatin (ZOCOR) 40 MG tablet Take 1 tablet by mouth nightly 90 tablet 3    Meloxicam 10 MG CAPS Take 15 mg/day by mouth daily 90 capsule 1    ibuprofen (ADVIL;MOTRIN) 800 MG tablet Take 1 tablet by mouth every 6 hours as needed for Pain 20 tablet 3     No current facility-administered medications for this visit.         Last 3 BMP  Lab Results   Component Value Date/Time     09/29/2021 08:20 AM     12/29/2020 09:52 AM     05/04/2020 10:05 AM    K 4.7 09/29/2021 08:20 AM    K 4.5 12/29/2020 09:52 AM    K 4.7 05/04/2020 10:05 AM     09/29/2021 08:20 AM     12/29/2020 09:52 AM     05/04/2020 10:05 AM    CO2 23 09/29/2021 08:20 AM    CO2 26 05/04/2020 10:05 AM    CO2 25 03/25/2016 09:44 AM    BUN 21 09/29/2021 08:20 AM    BUN 20 05/04/2020 10:05 AM    BUN 20 03/25/2016 09:44 AM    CREATININE 0.8 09/29/2021 08:20 AM    CREATININE 0.6 05/20/2021 09:46 AM    CREATININE 0.74 05/20/2021 09:16 AM    CREATININE 0.84 12/29/2020 09:52 AM    CREATININE 0.8 12/23/2019 12:00 AM    GLUCOSE 106 (H) 09/29/2021 08:20 AM    GLUCOSE 96 12/29/2020 09:52 AM    GLUCOSE 97 05/04/2020 10:05 AM GLUCOSE 99 03/25/2016 09:44 AM    CALCIUM 9.7 09/29/2021 08:20 AM    CALCIUM 9.6 12/29/2020 09:52 AM    CALCIUM 10.0 05/04/2020 10:05 AM       Last 3 CMP:    Lab Results   Component Value Date/Time     09/29/2021 08:20 AM     12/29/2020 09:52 AM     05/04/2020 10:05 AM    K 4.7 09/29/2021 08:20 AM    K 4.5 12/29/2020 09:52 AM    K 4.7 05/04/2020 10:05 AM     09/29/2021 08:20 AM     12/29/2020 09:52 AM     05/04/2020 10:05 AM    CO2 23 09/29/2021 08:20 AM    CO2 26 05/04/2020 10:05 AM    CO2 25 03/25/2016 09:44 AM    BUN 21 09/29/2021 08:20 AM    BUN 20 05/04/2020 10:05 AM    BUN 20 03/25/2016 09:44 AM    CREATININE 0.8 09/29/2021 08:20 AM    CREATININE 0.6 05/20/2021 09:46 AM    CREATININE 0.74 05/20/2021 09:16 AM    CREATININE 0.84 12/29/2020 09:52 AM    CREATININE 0.8 12/23/2019 12:00 AM    GLUCOSE 106 (H) 09/29/2021 08:20 AM    GLUCOSE 96 12/29/2020 09:52 AM    GLUCOSE 97 05/04/2020 10:05 AM    GLUCOSE 99 03/25/2016 09:44 AM    CALCIUM 9.7 09/29/2021 08:20 AM    CALCIUM 9.6 12/29/2020 09:52 AM    CALCIUM 10.0 05/04/2020 10:05 AM    PROT 7.0 09/29/2021 08:20 AM    PROT 8.0 05/04/2020 10:05 AM    PROT 7.3 03/25/2016 09:44 AM    LABALBU 4.4 09/29/2021 08:20 AM    LABALBU 4.6 05/04/2020 10:05 AM    LABALBU 4.1 03/25/2016 09:44 AM    LABALBU 4.4 12/12/2011 08:49 AM    BILITOT 0.5 09/29/2021 08:20 AM    BILITOT 0.9 05/04/2020 10:05 AM    BILITOT 0.7 03/25/2016 09:44 AM    ALKPHOS 62 09/29/2021 08:20 AM    ALKPHOS 63 05/04/2020 10:05 AM    ALKPHOS 54 03/25/2016 09:44 AM    AST 25 09/29/2021 08:20 AM    AST 22 05/04/2020 10:05 AM    AST 21 03/25/2016 09:44 AM    ALT 17 09/29/2021 08:20 AM    ALT 15 05/04/2020 10:05 AM    ALT 13 03/25/2016 09:44 AM        CBC:   Lab Results   Component Value Date/Time    WBC 4.4 (L) 09/29/2021 08:20 AM    RBC 5.06 09/29/2021 08:20 AM    HGB 15.4 09/29/2021 08:20 AM    HCT 45.9 09/29/2021 08:20 AM    MCV 90.7 09/29/2021 08:20 AM    MCH 30.4 09/29/2021 08:20 AM    MCHC 33.6 09/29/2021 08:20 AM    RDW 12.5 09/29/2021 08:20 AM     09/29/2021 08:20 AM    MPV 10.8 09/29/2021 08:20 AM       A1C:  Lab Results   Component Value Date/Time    LABA1C 5.6 09/29/2021 08:20 AM       Lipid panel:  Lab Results   Component Value Date    CHOL 189 09/29/2021    CHOL 178 05/04/2020    CHOL 178 12/23/2019    TRIG 139 09/29/2021    TRIG 69 05/04/2020    TRIG 82 12/23/2019    HDL 48 09/29/2021    HDL 54 05/04/2020    HDL 53 12/23/2019        Lab Results   Component Value Date/Time    PROT 7.0 09/29/2021 08:20 AM    PROT 8.0 05/04/2020 10:05 AM    PROT 7.3 03/25/2016 09:44 AM    INR 1.3 (H) 12/29/2020 09:52 AM       No results found for: MG      Assessment. Conor Billing was seen today for annual exam.    Diagnoses and all orders for this visit:    Routine general medical examination at a health care facility    Malignant melanoma, unspecified site [facility])    Need for influenza vaccination  -     INFLUENZA, QUADV, ADJUVANTED, 72 YRS =, IM, PF, PREFILL SYR, 0.5ML (FLUAD)    Leukopenia, unspecified type  -     CBC WITH AUTO DIFFERENTIAL; Future    Prostate cancer ([facility] [address]  -     CBC WITH AUTO DIFFERENTIAL; Future    Inflammatory arthritis  -     CBC WITH AUTO DIFFERENTIAL; Future  -     C-REACTIVE PROTEIN; Future  -     RHEUMATOID FACTOR; Future  -     ROCIO; Future  -     Sedimentation Rate; Future    Hyperlipidemia, unspecified hyperlipidemia type  -     COMPREHENSIVE METABOLIC PANEL; Future  -     LIPID PANEL;  Future       Patient Active Problem List   Diagnosis    Sprain of shoulder, right    Malignant melanoma ([facility] [address]    History of prostate cancer    Hyperlipidemia       Plan: General physical examination patient clinically doing very well    Malignant melanoma being followed by a specialist at [facility] he follows with him twice a year    Leukopenia WBC four-point 4 repeat CBC patient asymptomatic    History of prostate cancer stable PSA less than 0.03 no change from the last PSA    Possible inflammatory arthritis versus osteoarthritis in the hands check rheumatoid factor, sed rate, ROCIO, CRP,  Continue Mobic 15 mg daily which does help, risk of the medication discussed, monitor liver enzyme kidney function    Hyperlipidemia  should be less than 100 patient could not tolerate Lipitor severe muscle pains  He will do a better job with the diet continue simvastatin 40 mg could not increase the dose because of muscle cramps    Flu vaccine was given    Return in 7 months (on 5/4/2022) for Medicare Annual Wellness Visit in 1 year.        Tomas Pickett MD  12:42 PM  10/4/2021     [place]

## 2022-01-03 RX ORDER — MELOXICAM 15 MG/1
15 TABLET ORAL DAILY PRN
Qty: 90 TABLET | Refills: 1 | Status: SHIPPED | OUTPATIENT
Start: 2022-01-03 | End: 2022-05-31 | Stop reason: SDUPTHER

## 2022-05-31 ENCOUNTER — OFFICE VISIT (OUTPATIENT)
Dept: FAMILY MEDICINE CLINIC | Age: 72
End: 2022-05-31
Payer: MEDICARE

## 2022-05-31 VITALS
HEIGHT: 73 IN | OXYGEN SATURATION: 96 % | BODY MASS INDEX: 29.82 KG/M2 | HEART RATE: 66 BPM | RESPIRATION RATE: 18 BRPM | DIASTOLIC BLOOD PRESSURE: 78 MMHG | WEIGHT: 225 LBS | TEMPERATURE: 97.6 F | SYSTOLIC BLOOD PRESSURE: 122 MMHG

## 2022-05-31 DIAGNOSIS — Z76.89 ENCOUNTER TO ESTABLISH CARE WITH NEW DOCTOR: Primary | ICD-10-CM

## 2022-05-31 DIAGNOSIS — C61 PROSTATE CANCER (HCC): ICD-10-CM

## 2022-05-31 DIAGNOSIS — M25.532 BILATERAL WRIST PAIN: ICD-10-CM

## 2022-05-31 DIAGNOSIS — Z23 NEED FOR PNEUMOCOCCAL VACCINATION: ICD-10-CM

## 2022-05-31 DIAGNOSIS — M25.531 BILATERAL WRIST PAIN: ICD-10-CM

## 2022-05-31 DIAGNOSIS — E78.5 HYPERLIPIDEMIA, UNSPECIFIED HYPERLIPIDEMIA TYPE: ICD-10-CM

## 2022-05-31 DIAGNOSIS — L29.9 ITCHING OF EAR: ICD-10-CM

## 2022-05-31 DIAGNOSIS — M25.551 HIP PAIN, ACUTE, RIGHT: ICD-10-CM

## 2022-05-31 PROCEDURE — 90732 PPSV23 VACC 2 YRS+ SUBQ/IM: CPT | Performed by: FAMILY MEDICINE

## 2022-05-31 PROCEDURE — 99213 OFFICE O/P EST LOW 20 MIN: CPT | Performed by: FAMILY MEDICINE

## 2022-05-31 PROCEDURE — 3017F COLORECTAL CA SCREEN DOC REV: CPT | Performed by: FAMILY MEDICINE

## 2022-05-31 PROCEDURE — G8427 DOCREV CUR MEDS BY ELIG CLIN: HCPCS | Performed by: FAMILY MEDICINE

## 2022-05-31 PROCEDURE — 1036F TOBACCO NON-USER: CPT | Performed by: FAMILY MEDICINE

## 2022-05-31 PROCEDURE — G0009 ADMIN PNEUMOCOCCAL VACCINE: HCPCS | Performed by: FAMILY MEDICINE

## 2022-05-31 PROCEDURE — 1123F ACP DISCUSS/DSCN MKR DOCD: CPT | Performed by: FAMILY MEDICINE

## 2022-05-31 PROCEDURE — G8417 CALC BMI ABV UP PARAM F/U: HCPCS | Performed by: FAMILY MEDICINE

## 2022-05-31 RX ORDER — SIMVASTATIN 40 MG
40 TABLET ORAL NIGHTLY
Qty: 90 TABLET | Refills: 3 | Status: SHIPPED | OUTPATIENT
Start: 2022-05-31 | End: 2023-05-31

## 2022-05-31 RX ORDER — MELOXICAM 15 MG/1
15 TABLET ORAL DAILY PRN
Qty: 90 TABLET | Refills: 3 | Status: SHIPPED | OUTPATIENT
Start: 2022-05-31 | End: 2023-05-31

## 2022-05-31 RX ORDER — DOCUSATE SODIUM 250 MG
250 CAPSULE ORAL DAILY
COMMUNITY

## 2022-05-31 ASSESSMENT — ENCOUNTER SYMPTOMS
COUGH: 0
NAUSEA: 0
CONSTIPATION: 0
WHEEZING: 0
BLOOD IN STOOL: 0
SORE THROAT: 0
VOMITING: 0
BACK PAIN: 0
ABDOMINAL PAIN: 0
SHORTNESS OF BREATH: 0
DIARRHEA: 0

## 2022-05-31 ASSESSMENT — PATIENT HEALTH QUESTIONNAIRE - PHQ9
1. LITTLE INTEREST OR PLEASURE IN DOING THINGS: 0
SUM OF ALL RESPONSES TO PHQ9 QUESTIONS 1 & 2: 0
SUM OF ALL RESPONSES TO PHQ QUESTIONS 1-9: 0
2. FEELING DOWN, DEPRESSED OR HOPELESS: 0
SUM OF ALL RESPONSES TO PHQ QUESTIONS 1-9: 0

## 2022-05-31 NOTE — PROGRESS NOTES
Steffen Rizzo is a 73 y.o. male who presents today for     Chief Complaint   Patient presents with   [facility] Patient     dr Cayetano Diego patient , bi lateral hand pain, right hurts the worst, middle finger hurts the worst,  ears itchs alot, was given medication by dr Cayetano Diego and still itched.  Hip Pain     right hip pain        Chief concerns include stiffness and pain of bilateral hands    Stiffness and Pain, bilateral hands: Onset: longstanding. Location: bilateral hands and wrists. Injury: N/A. Description: aching and tightness. Duration: constant but can increase/decrease in intensity. Intensity: moderately severe. Exacerbated by: not using hands. Relieved by: movement/use. Associated symptoms: N/A  Medication/therapy trial: Meloxicam 15 mg/day. No previous diagnosis/treatment CTS. Plays sports that require gripping (ie golf) and holding paddle (pickleball and racquet ball)    Itching, Ears:  Longstanding. Topical medication do not provide relief. He has been to scratch his ears intensely without relief. No previous diagnosis/treatment of allergies. Further history: he reports that he always itches. .. anywhere    Prostate Cancer:  S/P prostatectomy 2016. Stopped following with specialist 2020; PCP has been following PSA since then. [address]:  Patient's past medical, surgical, social and/or family history reviewed, updated in chart, and are non-contributory (unless otherwise stated). Medications and allergies also reviewed and updated in chart. Review of Systems  Review of Systems   HENT: Negative for congestion, ear pain and sore throat. Respiratory: Negative for cough, shortness of breath and wheezing. Cardiovascular: Negative for chest pain, palpitations and leg swelling. Gastrointestinal: Negative for abdominal pain, blood in stool, constipation, diarrhea, nausea and vomiting. Genitourinary: Negative for dysuria, frequency, hematuria and urgency.    Musculoskeletal: Negative for back pain, myalgias and neck pain. Skin: Negative for rash. Neurological: Negative for dizziness, weakness and headaches. Psychiatric/Behavioral: The patient is not nervous/anxious. Physical Exam:    VS:  There were no vitals taken for this visit. LAST WEIGHT:  Wt Readings from Last 3 Encounters:   10/04/21 233 lb (105.7 kg)   06/16/21 230 lb (104.3 kg)   10/08/20 236 lb (107 kg)       BMI Readings from Last 3 Encounters:   10/04/21 30.74 kg/m²   06/16/21 30.34 kg/m²   10/08/20 31.14 kg/m²       Physical Exam  Constitutional:       General: He is not in acute distress. Appearance: He is well-developed. He is not diaphoretic. HENT:      Head: Normocephalic and atraumatic. Right Ear: External ear normal.      Left Ear: External ear normal.      Mouth/Throat:      Pharynx: No oropharyngeal exudate. Eyes:      General: No scleral icterus. Right eye: No discharge. Conjunctiva/sclera: Conjunctivae normal.      Pupils: Pupils are equal, round, and reactive to light. Neck:      Thyroid: No thyromegaly. Cardiovascular:      Rate and Rhythm: Normal rate and regular rhythm. Heart sounds: Normal heart sounds. No murmur heard. Pulmonary:      Effort: Pulmonary effort is normal. No respiratory distress. Breath sounds: No stridor. No wheezing or rales. Chest:      Chest wall: No tenderness. Abdominal:      General: Bowel sounds are normal. There is no distension. Palpations: Abdomen is soft. There is no mass. Tenderness: There is no abdominal tenderness. There is no guarding. Musculoskeletal:         General: No tenderness. Normal range of motion. Cervical back: Normal range of motion and neck supple. Lymphadenopathy:      Cervical: No cervical adenopathy. Skin:     General: Skin is warm and dry. Coloration: Skin is not pale. Findings: No erythema or rash.    Neurological:      Mental Status: He is alert and oriented to person, place, and time. Psychiatric:         Behavior: Behavior normal.         Thought Content: Thought content normal.         Labs:  Lab Results   Component Value Date     09/29/2021    K 4.7 09/29/2021     09/29/2021    CO2 23 09/29/2021    BUN 21 09/29/2021    CREATININE 0.89 12/07/2021    CREATININE 0.8 12/23/2019    PROT 7.0 09/29/2021    LABALBU 4.4 09/29/2021    LABALBU 4.4 12/12/2011    CALCIUM 9.7 09/29/2021    GFRAA >60 12/07/2021    LABGLOM >60 12/07/2021    GLUCOSE 106 09/29/2021    GLUCOSE 96 12/29/2020    AST 25 09/29/2021    ALT 17 09/29/2021    ALKPHOS 62 09/29/2021    BILITOT 0.5 09/29/2021    TSH 1.950 09/29/2021    CHOL 189 09/29/2021    TRIG 139 09/29/2021    HDL 48 09/29/2021    LDLCALC 113 09/29/2021    LABA1C 5.6 09/29/2021        Lab Results   Component Value Date    CHOL 189 09/29/2021    CHOL 178 05/04/2020    CHOL 178 12/23/2019     Lab Results   Component Value Date    TRIG 139 09/29/2021    TRIG 69 05/04/2020    TRIG 82 12/23/2019     Lab Results   Component Value Date    HDL 48 09/29/2021    HDL 54 05/04/2020    HDL 53 12/23/2019     Lab Results   Component Value Date    LDLCALC 113 (H) 09/29/2021    LDLCALC 110 (H) 05/04/2020    LDLCALC 109 12/23/2019       Lab Results   Component Value Date    LABA1C 5.6 09/29/2021    LABA1C 5.6 12/23/2019    LABA1C 5.6 03/25/2016     Lab Results   Component Value Date    LDLCALC 113 (H) 09/29/2021    CREATININE 0.89 12/07/2021           Assessment / Plan:      Juno Wolf was seen today for new patient and hip pain. Diagnoses and all orders for this visit:    Encounter to establish care with new doctor    Bilateral wrist pain: Differential diagnosis may include bilateral carpal tunnel. Shared decision making with patient and PCP agreed to conservative measures at this time. -     diclofenac sodium (VOLTAREN) 1 % GEL;  Apply 2 g topically 4 times daily  -     [facility]    Itching of ear: Patient was offered a variety of treatments, including p.o. medication, topical medication, and ENT referral; he declines all of these. Prostate cancer [facility]): Due for PSA screening    Hyperlipidemia, unspecified hyperlipidemia type: Medication refill  -     simvastatin (ZOCOR) 40 MG tablet; Take 1 tablet by mouth nightly    Hip pain, acute, right: Stable well-controlled. Medication refill  -     meloxicam (MOBIC) 15 MG tablet; Take 1 tablet by mouth daily as needed for Pain    Need for pneumococcal vaccination  -     Pneumococcal polysaccharide vaccine 23-valent greater than or equal to 3yo subcutaneous/IM        Follow Up:  Return 1) F/U 6 weeks to assess response of symptoms (wrist pain) to new treatment, for 2) Schedule Medicare AWV on or after 10/5/22. or sooner if necessary. Call or go to ED immediately if symptoms worsen or persist.    Educational materials and/or home exercises printed for patient's review and were included in patient instructions on his/her AfterVisit Summary and given to patient at the end of visit. Counseled regarding above diagnosis,including possible risks and complications,  especially if left uncontrolled. Counseled regarding the possible side effects, risks, benefits and alternatives to treatment; patient and/or guardian verbalizes understanding, agrees, feels comfortable with and wishes to proceed with above treatment plan. Advised patient tocall with any new medication issues, and read all Rx info from pharmacy to assureaware of all possible risks and side effects of medication before taking. Reviewed age and gender appropriate health screening exams and vaccinations. Advisedpatient regarding importance of keeping up with recommended health maintenance andto schedule as soon as possible if overdue, as this is important in assessing forundiagnosed pathology, especially cancer, as well as protecting against potentially harmful/life threatening disease.       Patient and/or guardian verbalizes understandingand agrees with above counseling, assessment and plan. All questions answered.     Dominic Barrera MD on 5/31/22

## 2022-07-12 ENCOUNTER — OFFICE VISIT (OUTPATIENT)
Dept: FAMILY MEDICINE CLINIC | Age: 72
End: 2022-07-12
Payer: MEDICARE

## 2022-07-12 ENCOUNTER — TELEPHONE (OUTPATIENT)
Dept: FAMILY MEDICINE CLINIC | Age: 72
End: 2022-07-12

## 2022-07-12 VITALS
WEIGHT: 226 LBS | OXYGEN SATURATION: 95 % | SYSTOLIC BLOOD PRESSURE: 120 MMHG | TEMPERATURE: 96.8 F | RESPIRATION RATE: 16 BRPM | BODY MASS INDEX: 29.95 KG/M2 | DIASTOLIC BLOOD PRESSURE: 68 MMHG | HEART RATE: 55 BPM | HEIGHT: 73 IN

## 2022-07-12 DIAGNOSIS — M25.531 BILATERAL WRIST PAIN: Primary | ICD-10-CM

## 2022-07-12 DIAGNOSIS — M25.532 BILATERAL WRIST PAIN: Primary | ICD-10-CM

## 2022-07-12 DIAGNOSIS — R53.1 WEAKNESS: ICD-10-CM

## 2022-07-12 DIAGNOSIS — M79.2 RADICULAR PAIN IN RIGHT ARM: ICD-10-CM

## 2022-07-12 PROCEDURE — G8417 CALC BMI ABV UP PARAM F/U: HCPCS | Performed by: FAMILY MEDICINE

## 2022-07-12 PROCEDURE — 3017F COLORECTAL CA SCREEN DOC REV: CPT | Performed by: FAMILY MEDICINE

## 2022-07-12 PROCEDURE — G8427 DOCREV CUR MEDS BY ELIG CLIN: HCPCS | Performed by: FAMILY MEDICINE

## 2022-07-12 PROCEDURE — 1036F TOBACCO NON-USER: CPT | Performed by: FAMILY MEDICINE

## 2022-07-12 PROCEDURE — 1123F ACP DISCUSS/DSCN MKR DOCD: CPT | Performed by: FAMILY MEDICINE

## 2022-07-12 PROCEDURE — 99213 OFFICE O/P EST LOW 20 MIN: CPT | Performed by: FAMILY MEDICINE

## 2022-07-12 RX ORDER — MELOXICAM 15 MG/1
15 TABLET ORAL DAILY PRN
Qty: 90 TABLET | Refills: 3 | Status: CANCELLED | OUTPATIENT
Start: 2022-07-12 | End: 2023-07-12

## 2022-07-12 SDOH — ECONOMIC STABILITY: FOOD INSECURITY: WITHIN THE PAST 12 MONTHS, THE FOOD YOU BOUGHT JUST DIDN'T LAST AND YOU DIDN'T HAVE MONEY TO GET MORE.: NEVER TRUE

## 2022-07-12 SDOH — ECONOMIC STABILITY: FOOD INSECURITY: WITHIN THE PAST 12 MONTHS, YOU WORRIED THAT YOUR FOOD WOULD RUN OUT BEFORE YOU GOT MONEY TO BUY MORE.: NEVER TRUE

## 2022-07-12 ASSESSMENT — ENCOUNTER SYMPTOMS
SHORTNESS OF BREATH: 0
CONSTIPATION: 0
NAUSEA: 0
COUGH: 0
BACK PAIN: 0
DIARRHEA: 0
WHEEZING: 0
VOMITING: 0
BLOOD IN STOOL: 0
ABDOMINAL PAIN: 0
SORE THROAT: 0

## 2022-07-12 ASSESSMENT — LIFESTYLE VARIABLES: HOW OFTEN DO YOU HAVE A DRINK CONTAINING ALCOHOL: NEVER

## 2022-07-12 ASSESSMENT — SOCIAL DETERMINANTS OF HEALTH (SDOH): HOW HARD IS IT FOR YOU TO PAY FOR THE VERY BASICS LIKE FOOD, HOUSING, MEDICAL CARE, AND HEATING?: NOT HARD AT ALL

## 2022-07-12 NOTE — TELEPHONE ENCOUNTER
Patient would like to have a different referral for OT. [facility] does not have any openings and wants patient to go to [facility],  Patient does not want to go to [facility], wants to stay in the [place] area. [facility].

## 2022-07-12 NOTE — PROGRESS NOTES
Oli Kirk is a 73 y.o. male who presents today for     Chief Complaint   Patient presents with    Follow-up     was never call for the wrist therapy, now getting tingling up and down right arm       Stiffness and Pain, bilateral hands: Onset: longstanding. Location: bilateral hands and wrists. Injury: N/A. Description: aching and tightness. Duration: constant but can increase/decrease in intensity. Intensity: moderately severe. Exacerbated by: not using hands. Relieved by: movement/use. Associated symptoms: N/A  Medication/therapy trial: Meloxicam 15 mg/day. No previous diagnosis/treatment CTS. Plays sports that require gripping (ie golf) and holding paddle (pickleball and racquet ball)    ASSESSMENT/PLAN, 5/31/22:    Bilateral wrist pain: Differential diagnosis may include bilateral carpal tunnel. Shared decision making with patient and PCP agreed to conservative measures at this time. -     diclofenac sodium (VOLTAREN) 1 % GEL; Apply 2 g topically 4 times daily  -     [address], [place]    Follow Up:  Return 1) F/U 6 weeks to assess response of symptoms (wrist pain) to new treatment    CURRENT STATUS (07/12/22): Reports that he was never contacted by OT to schedule, so OT never happened. Diclofenac gel was ineffective; can't tell if meloxicam 15 mg PO is helping. [address]:  Patient's past medical, surgical, social and/or family history reviewed, updated in chart, and are non-contributory (unless otherwise stated). Medications and allergies also reviewed and updated in chart. Review of Systems  Review of Systems   HENT: Negative for congestion, ear pain and sore throat. Respiratory: Negative for cough, shortness of breath and wheezing. Cardiovascular: Negative for chest pain, palpitations and leg swelling. Gastrointestinal: Negative for abdominal pain, blood in stool, constipation, diarrhea, nausea and vomiting.    Genitourinary: Negative for dysuria, frequency, hematuria and urgency. Musculoskeletal: Negative for back pain, myalgias and neck pain. Skin: Negative for rash. Neurological: Negative for dizziness, weakness and headaches. Psychiatric/Behavioral: The patient is not nervous/anxious. Physical Exam:    VS:  /68   Pulse 55   Temp 96.8 °F (36 °C) (Infrared)   Resp 16   Ht 6' 1\" (1.854 m)   Wt 226 lb (102.5 kg)   SpO2 95%   BMI 29.82 kg/m²     LAST WEIGHT:  Wt Readings from Last 3 Encounters:   07/12/22 226 lb (102.5 kg)   05/31/22 225 lb (102.1 kg)   10/04/21 233 lb (105.7 kg)       BMI Readings from Last 3 Encounters:   07/12/22 29.82 kg/m²   05/31/22 29.69 kg/m²   10/04/21 30.74 kg/m²       Physical Exam  Constitutional:       General: He is not in acute distress. Appearance: He is well-developed. He is not diaphoretic. HENT:      Head: Normocephalic and atraumatic. Right Ear: External ear normal.      Left Ear: External ear normal.      Mouth/Throat:      Pharynx: No oropharyngeal exudate. Eyes:      General: No scleral icterus. Right eye: No discharge. Conjunctiva/sclera: Conjunctivae normal.      Pupils: Pupils are equal, round, and reactive to light. Neck:      Thyroid: No thyromegaly. Cardiovascular:      Rate and Rhythm: Normal rate and regular rhythm. Heart sounds: Normal heart sounds. No murmur heard. Pulmonary:      Effort: Pulmonary effort is normal. No respiratory distress. Breath sounds: No stridor. No wheezing or rales. Chest:      Chest wall: No tenderness. Abdominal:      General: Bowel sounds are normal. There is no distension. Palpations: Abdomen is soft. There is no mass. Tenderness: There is no abdominal tenderness. There is no guarding. Musculoskeletal:         General: No tenderness. Normal range of motion. Cervical back: Normal range of motion and neck supple. Lymphadenopathy:      Cervical: No cervical adenopathy.    Skin: General: Skin is warm and dry. Coloration: Skin is not pale. Findings: No erythema or rash. Neurological:      Mental Status: He is alert and oriented to person, place, and time. Psychiatric:         Behavior: Behavior normal.         Thought Content: Thought content normal.         Labs:  Lab Results   Component Value Date/Time     09/29/2021 08:20 AM    K 4.7 09/29/2021 08:20 AM     09/29/2021 08:20 AM    CO2 23 09/29/2021 08:20 AM    BUN 21 09/29/2021 08:20 AM    CREATININE 0.89 12/07/2021 09:03 AM    CREATININE 0.8 12/23/2019 12:00 AM    PROT 7.0 09/29/2021 08:20 AM    LABALBU 4.4 09/29/2021 08:20 AM    LABALBU 4.4 12/12/2011 08:49 AM    CALCIUM 9.7 09/29/2021 08:20 AM    GFRAA >60 12/07/2021 09:03 AM    LABGLOM >60 12/07/2021 09:03 AM    GLUCOSE 106 09/29/2021 08:20 AM    GLUCOSE 96 12/29/2020 09:52 AM    AST 25 09/29/2021 08:20 AM    ALT 17 09/29/2021 08:20 AM    ALKPHOS 62 09/29/2021 08:20 AM    BILITOT 0.5 09/29/2021 08:20 AM    TSH 1.950 09/29/2021 08:20 AM    CHOL 189 09/29/2021 08:20 AM    TRIG 139 09/29/2021 08:20 AM    HDL 48 09/29/2021 08:20 AM    LDLCALC 113 09/29/2021 08:20 AM    LABA1C 5.6 09/29/2021 08:20 AM        Lab Results   Component Value Date    CHOL 189 09/29/2021    CHOL 178 05/04/2020    CHOL 178 12/23/2019     Lab Results   Component Value Date    TRIG 139 09/29/2021    TRIG 69 05/04/2020    TRIG 82 12/23/2019     Lab Results   Component Value Date    HDL 48 09/29/2021    HDL 54 05/04/2020    HDL 53 12/23/2019     Lab Results   Component Value Date    LDLCALC 113 (H) 09/29/2021    LDLCALC 110 (H) 05/04/2020    LDLCALC 109 12/23/2019       Lab Results   Component Value Date    LABA1C 5.6 09/29/2021    LABA1C 5.6 12/23/2019    LABA1C 5.6 03/25/2016     Lab Results   Component Value Date    LDLCALC 113 (H) 09/29/2021    CREATININE 0.89 12/07/2021       Assessment / Plan:      Jenniffer Escobar was seen today for follow-up.     Diagnoses and all orders for this visit:    Bilateral wrist pain: Persistent/worsening symptoms  -     EMG; Future  -     Past  referral for OT printed out for patient to pursue    Radicular pain in right arm: Persistent/worsening symptoms  -     EMG; Future        -     Past  referral for OT printed out for patient to pursue    Weakness: Persistent/worsening symptoms  -     EMG; Future  -     Past  referral for OT printed out for patient to pursue        Follow Up:  Return for Keep scheduled appointment(s). or sooner if necessary. Call or go to ED immediately if symptoms worsen or persist.    Educational materials  printed for patient's review and were included in patient instructions on his/her AfterVisit Summary and given to patient at the end of visit. Counseled regarding above diagnosis,including possible risks and complications,  especially if left uncontrolled. Counseled regarding the possible side effects, risks, benefits and alternatives to treatment; patient and/or guardian verbalizes understanding, agrees, feels comfortable with and wishes to proceed with above treatment plan. Advised patient tocall with any new medication issues, and read all Rx info from pharmacy to assureaware of all possible risks and side effects of medication before taking. Reviewed age and gender appropriate health screening exams and vaccinations. Advisedpatient regarding importance of keeping up with recommended health maintenance andto schedule as soon as possible if overdue, as this is important in assessing forundiagnosed pathology, especially cancer, as well as protecting against potentially harmful/life threatening disease. Patient and/or guardian verbalizes understandingand agrees with above counseling, assessment and plan. All questions answered.     Veronika Carrillo MD on 5/31/22

## 2022-07-12 NOTE — PATIENT INSTRUCTIONS
Patient Education        Electromyogram (EMG) and Nerve Conduction Studies: About These Tests  What are they? An electromyogram (EMG) measures the electrical activity of your muscles when you are not using them(at rest) and when you tighten them (muscle contraction). Nerve conduction studies (NCS) measure how well and how fast the nerves can send electrical signals. EMG and nerve conduction studies are often done together. If they are donetogether, the nerve conduction studies are done before the EMG. Why are they done? You may need an EMG to find diseases that damage your muscles or nerves or to find why you can't move your muscles (paralysis), why they feel weak, or why they twitch. These problems may include a herniated disc, amyotrophic lateral sclerosis (ALS), ormyasthenia gravis (MG). You may need nerve conduction studies to find damage to the nerves that lead from the brain and spinal cord to the rest of the body. (This is called the peripheral nervous system.) These studiesare often used to help find nerve disorders, such as carpal tunnel syndrome. How do you prepare for these tests?     Wear loose-fitting clothing. You may be given a hospital gown to wear.      The electrodes for the test are attached to your skin. Your skin needs to be clean and free of sprays, oils, creams, and lotions.      You may be asked to sign a consent form that says you understand the risks of the test and agree to have it done.  Tell your doctor ALL the medicines, vitamins, supplements, and herbal remedies you take. Some may increase the risk of problems during your test. Your doctor will tell you if you should stop taking any of them before the test and how soon to do it.      If you take aspirin or some other blood thinner, ask your doctor if you should stop taking it before your test. Make sure that you understand exactly what your doctor wants you to do. These medicines increase the risk of bleeding.    How are the tests done? You lie on a table or bed or sit in a reclining chair so your muscles arerelaxed. For an EMG:    Your doctor will insert a needle electrode into a muscle. This will record the electrical activity while the muscle is at rest.   Your doctor will ask you to tighten the same muscle slowly and steadily while the electrical activity is recorded.  Your doctor may move the electrode to a different area of the muscle or a different muscle. For nerve conduction studies:    Your doctor will attach two types of electrodes to your skin. ? One type of electrode is placed over a nerve and will give the nerve an electrical pulse. ? The other type of electrode is placed over the muscle that the nerve controls. It will record how long it takes the muscle to react to the electrical pulse. How does having electromyogram (EMG) and nerve conduction studies feel? During an EMG test, you may feel a quick, sharp pain when the needle electrodeis put into a muscle. With nerve conduction studies, you will be able to feel the electrical pulses. The tests make some people anxious. Keep in mind that only a very low-voltage electrical current is used. And each electrical pulse is very quick. It lastsless than a second. How long do they take?  An EMG may take 30 to 60 minutes.  Nerve conduction tests may take from 15 minutes to 1 hour or more. It depends on how many nerves and muscles your doctor tests. What happens after these tests?  After the test, you may be sore and feel a tingling in your muscles. This may last for up to 2 days.  If any of the test areas are sore:  ? Put ice or a cold pack on the area for 10 to 20 minutes at a time. Put a thin cloth between the ice and your skin. ? Take an over-the-counter pain medicine, such as acetaminophen (Tylenol), ibuprofen (Advil, Motrin), or naproxen (Aleve). Be safe with medicines. Read and follow all instructions on the label.    You will probably be able to go home right away. It depends on the reason for the test.   You can go back to your usual activities right away. When should you call for help? Watch closely for changes in your health, and be sure to contact your doctor if:   Muscle pain from an EMG test gets worse or you have swelling, tenderness, or pus at any of the needle sites.  You have any problems that you think may be from the test.   You have any questions about the test or have not received your results. Follow-up care is a key part of your treatment and safety. Be sure to make and go to all appointments, and call your doctor if you are having problems. It's also a good idea to keep a list of the medicines youtake. Ask your doctor when you can expect to have your test results. Where can you learn more? Go to https://[email or web address].[email or web address]. org and sign in to your [employer] account. Enter W704 in the [place] box to learn more about \"Electromyogram (EMG) and Nerve Conduction Studies: About These Tests. \"     If you do not have an account, please click on the \"Sign Up Now\" link. Current as of: December 13, 2021               Content Version: 13.3  © 3677-6009 [employer]wise, Incorporated. Care instructions adapted under license by [facility] ([facility]). If you have questions about a medical condition or this instruction, always ask your healthcare professional. [address]  any warranty or liability for your use of this information.

## 2022-07-13 ENCOUNTER — TELEPHONE (OUTPATIENT)
Dept: FAMILY MEDICINE CLINIC | Age: 72
End: 2022-07-13

## 2023-03-27 ENCOUNTER — TELEPHONE (OUTPATIENT)
Dept: FAMILY MEDICINE CLINIC | Age: 73
End: 2023-03-27

## 2023-03-27 NOTE — TELEPHONE ENCOUNTER
I called patient to RS his Medicare AWV which he cancelled in October. Patient informed he is out of stated right now and will call back to make his appt when he's back in town.

## 2023-05-18 LAB
CREATININE (MG/DL) IN SER/PLAS: 0.76 MG/DL (ref 0.5–1.3)
GFR MALE: >90 ML/MIN/1.73M2

## 2023-06-22 DIAGNOSIS — Z51.81 ENCOUNTER FOR MEDICATION MONITORING: Primary | ICD-10-CM

## 2023-06-22 DIAGNOSIS — M25.551 HIP PAIN, ACUTE, RIGHT: ICD-10-CM

## 2023-06-22 DIAGNOSIS — C61 PROSTATE CANCER (HCC): ICD-10-CM

## 2023-06-22 DIAGNOSIS — E78.5 HYPERLIPIDEMIA, UNSPECIFIED HYPERLIPIDEMIA TYPE: ICD-10-CM

## 2023-06-22 RX ORDER — SIMVASTATIN 40 MG
40 TABLET ORAL NIGHTLY
Qty: 90 TABLET | Refills: 3 | Status: CANCELLED | OUTPATIENT
Start: 2023-06-22 | End: 2024-06-21

## 2023-06-22 RX ORDER — MELOXICAM 15 MG/1
15 TABLET ORAL DAILY PRN
Qty: 90 TABLET | Refills: 3 | Status: CANCELLED | OUTPATIENT
Start: 2023-06-22 | End: 2024-06-21

## 2023-06-22 NOTE — TELEPHONE ENCOUNTER
Patient's wife called for refills. I informed that patient is due for an appt, since none had been scheduled.   Appt made for Medicare AWV 7/5/23 at 2:15 pm.    Last seen 7/12/2022  Next appt 7/5/2023  [facility] [place]

## 2023-06-23 NOTE — TELEPHONE ENCOUNTER
Per Dr. Romana Canary -     Please notify patient that I will refill his medications at his upcoming appointment. After reviewing his chart, I see that he is due for some lab work; I have put in the lab orders and I would like him to get these labs drawn about a day or 2 prior to his upcoming visit.

## 2023-06-23 NOTE — TELEPHONE ENCOUNTER
Patient's wife called back and confirmed that they rec'd the  msg. She asked if lab for PSA could be put in. I confirmed that Dr. Galloway Reveal included PSA in his orders.

## 2023-06-23 NOTE — TELEPHONE ENCOUNTER
I called patient and left a detailed  msg informing him, as noted by Dr. Reginald Avila. I informed him that he will need to fast for his labs.

## 2023-07-03 DIAGNOSIS — Z51.81 ENCOUNTER FOR MEDICATION MONITORING: ICD-10-CM

## 2023-07-03 DIAGNOSIS — E78.5 HYPERLIPIDEMIA, UNSPECIFIED HYPERLIPIDEMIA TYPE: ICD-10-CM

## 2023-07-03 DIAGNOSIS — C61 PROSTATE CANCER (HCC): ICD-10-CM

## 2023-07-03 LAB
ALBUMIN SERPL-MCNC: 4.3 G/DL (ref 3.5–5.2)
ALP SERPL-CCNC: 63 U/L (ref 40–129)
ALT SERPL-CCNC: 16 U/L (ref 0–40)
ANION GAP SERPL CALCULATED.3IONS-SCNC: 13 MMOL/L (ref 7–16)
AST SERPL-CCNC: 22 U/L (ref 0–39)
BASOPHILS # BLD: 0.02 E9/L (ref 0–0.2)
BASOPHILS NFR BLD: 0.4 % (ref 0–2)
BILIRUB SERPL-MCNC: 0.4 MG/DL (ref 0–1.2)
BUN SERPL-MCNC: 20 MG/DL (ref 6–23)
CALCIUM SERPL-MCNC: 9.2 MG/DL (ref 8.6–10.2)
CHLORIDE SERPL-SCNC: 106 MMOL/L (ref 98–107)
CHOLESTEROL, TOTAL: 184 MG/DL (ref 0–199)
CO2 SERPL-SCNC: 22 MMOL/L (ref 22–29)
CREAT SERPL-MCNC: 0.8 MG/DL (ref 0.7–1.2)
EOSINOPHIL # BLD: 0.08 E9/L (ref 0.05–0.5)
EOSINOPHIL NFR BLD: 1.6 % (ref 0–6)
ERYTHROCYTE [DISTWIDTH] IN BLOOD BY AUTOMATED COUNT: 12.8 FL (ref 11.5–15)
GLUCOSE SERPL-MCNC: 106 MG/DL (ref 74–99)
HCT VFR BLD AUTO: 47.9 % (ref 37–54)
HDLC SERPL-MCNC: 53 MG/DL
HGB BLD-MCNC: 15.3 G/DL (ref 12.5–16.5)
IMM GRANULOCYTES # BLD: 0.02 E9/L
IMM GRANULOCYTES NFR BLD: 0.4 % (ref 0–5)
LDLC SERPL CALC-MCNC: 118 MG/DL (ref 0–99)
LYMPHOCYTES # BLD: 1.1 E9/L (ref 1.5–4)
LYMPHOCYTES NFR BLD: 21.7 % (ref 20–42)
MCH RBC QN AUTO: 30.4 PG (ref 26–35)
MCHC RBC AUTO-ENTMCNC: 31.9 % (ref 32–34.5)
MCV RBC AUTO: 95.2 FL (ref 80–99.9)
MONOCYTES # BLD: 0.38 E9/L (ref 0.1–0.95)
MONOCYTES NFR BLD: 7.5 % (ref 2–12)
NEUTROPHILS # BLD: 3.48 E9/L (ref 1.8–7.3)
NEUTS SEG NFR BLD: 68.4 % (ref 43–80)
PLATELET # BLD AUTO: 200 E9/L (ref 130–450)
PMV BLD AUTO: 10.7 FL (ref 7–12)
POTASSIUM SERPL-SCNC: 4.6 MMOL/L (ref 3.5–5)
PROT SERPL-MCNC: 7.4 G/DL (ref 6.4–8.3)
PSA SERPL-MCNC: <0.01 NG/ML (ref 0–4)
RBC # BLD AUTO: 5.03 E12/L (ref 3.8–5.8)
SODIUM SERPL-SCNC: 141 MMOL/L (ref 132–146)
TRIGL SERPL-MCNC: 64 MG/DL (ref 0–149)
TSH SERPL-MCNC: 1.2 UIU/ML (ref 0.27–4.2)
VLDLC SERPL CALC-MCNC: 13 MG/DL
WBC # BLD: 5.1 E9/L (ref 4.5–11.5)

## 2023-07-05 ENCOUNTER — OFFICE VISIT (OUTPATIENT)
Dept: FAMILY MEDICINE CLINIC | Age: 73
End: 2023-07-05
Payer: MEDICARE

## 2023-07-05 VITALS
HEIGHT: 73 IN | DIASTOLIC BLOOD PRESSURE: 74 MMHG | HEART RATE: 76 BPM | SYSTOLIC BLOOD PRESSURE: 124 MMHG | WEIGHT: 217 LBS | RESPIRATION RATE: 16 BRPM | BODY MASS INDEX: 28.76 KG/M2 | OXYGEN SATURATION: 98 %

## 2023-07-05 DIAGNOSIS — Z23 NEED FOR PROPHYLACTIC VACCINATION AGAINST STREPTOCOCCUS PNEUMONIAE (PNEUMOCOCCUS): ICD-10-CM

## 2023-07-05 DIAGNOSIS — M19.90 INFLAMMATORY ARTHRITIS: ICD-10-CM

## 2023-07-05 DIAGNOSIS — Z23 NEED FOR PROPHYLACTIC VACCINATION AND INOCULATION AGAINST VARICELLA: ICD-10-CM

## 2023-07-05 DIAGNOSIS — R79.9 ABNORMAL FINDING OF BLOOD CHEMISTRY, UNSPECIFIED: ICD-10-CM

## 2023-07-05 DIAGNOSIS — M25.551 HIP PAIN, ACUTE, RIGHT: ICD-10-CM

## 2023-07-05 DIAGNOSIS — E78.5 HYPERLIPIDEMIA, UNSPECIFIED HYPERLIPIDEMIA TYPE: ICD-10-CM

## 2023-07-05 DIAGNOSIS — C61 PROSTATE CANCER (HCC): ICD-10-CM

## 2023-07-05 DIAGNOSIS — Z00.00 MEDICARE ANNUAL WELLNESS VISIT, SUBSEQUENT: Primary | ICD-10-CM

## 2023-07-05 PROCEDURE — 3017F COLORECTAL CA SCREEN DOC REV: CPT | Performed by: FAMILY MEDICINE

## 2023-07-05 PROCEDURE — G0009 ADMIN PNEUMOCOCCAL VACCINE: HCPCS | Performed by: FAMILY MEDICINE

## 2023-07-05 PROCEDURE — 90677 PCV20 VACCINE IM: CPT | Performed by: FAMILY MEDICINE

## 2023-07-05 PROCEDURE — 1123F ACP DISCUSS/DSCN MKR DOCD: CPT | Performed by: FAMILY MEDICINE

## 2023-07-05 PROCEDURE — G0439 PPPS, SUBSEQ VISIT: HCPCS | Performed by: FAMILY MEDICINE

## 2023-07-05 RX ORDER — SIMVASTATIN 40 MG
40 TABLET ORAL NIGHTLY
Qty: 90 TABLET | Refills: 3 | Status: SHIPPED | OUTPATIENT
Start: 2023-07-05 | End: 2024-07-05

## 2023-07-05 RX ORDER — MELOXICAM 15 MG/1
15 TABLET ORAL DAILY PRN
Qty: 90 TABLET | Refills: 3 | Status: SHIPPED | OUTPATIENT
Start: 2023-07-05 | End: 2024-07-05

## 2023-07-05 SDOH — ECONOMIC STABILITY: FOOD INSECURITY: WITHIN THE PAST 12 MONTHS, YOU WORRIED THAT YOUR FOOD WOULD RUN OUT BEFORE YOU GOT MONEY TO BUY MORE.: NEVER TRUE

## 2023-07-05 SDOH — ECONOMIC STABILITY: INCOME INSECURITY: HOW HARD IS IT FOR YOU TO PAY FOR THE VERY BASICS LIKE FOOD, HOUSING, MEDICAL CARE, AND HEATING?: NOT HARD AT ALL

## 2023-07-05 SDOH — ECONOMIC STABILITY: FOOD INSECURITY: WITHIN THE PAST 12 MONTHS, THE FOOD YOU BOUGHT JUST DIDN'T LAST AND YOU DIDN'T HAVE MONEY TO GET MORE.: NEVER TRUE

## 2023-07-05 SDOH — ECONOMIC STABILITY: HOUSING INSECURITY
IN THE LAST 12 MONTHS, WAS THERE A TIME WHEN YOU DID NOT HAVE A STEADY PLACE TO SLEEP OR SLEPT IN A SHELTER (INCLUDING NOW)?: NO

## 2023-07-05 ASSESSMENT — PATIENT HEALTH QUESTIONNAIRE - PHQ9
2. FEELING DOWN, DEPRESSED OR HOPELESS: 0
1. LITTLE INTEREST OR PLEASURE IN DOING THINGS: 0
SUM OF ALL RESPONSES TO PHQ QUESTIONS 1-9: 0
SUM OF ALL RESPONSES TO PHQ9 QUESTIONS 1 & 2: 0

## 2023-07-05 ASSESSMENT — LIFESTYLE VARIABLES
HOW OFTEN DO YOU HAVE A DRINK CONTAINING ALCOHOL: NEVER
HOW MANY STANDARD DRINKS CONTAINING ALCOHOL DO YOU HAVE ON A TYPICAL DAY: PATIENT DOES NOT DRINK

## 2023-07-05 NOTE — PROGRESS NOTES
light, extraocular eye movements intact, conjunctivae normal  ENT: tympanic membrane, external ear and ear canal normal bilaterally, nose without deformity, nasal mucosa and turbinates normal without polyps  Neck: supple and non-tender without mass, no thyromegaly or thyroid nodules, no cervical lymphadenopathy  Pulmonary/Chest: clear to auscultation bilaterally- no wheezes, rales or rhonchi, normal air movement, no respiratory distress  Cardiovascular: normal rate, regular rhythm, normal S1 and S2, no murmurs, rubs, clicks, or gallops, distal pulses intact, no carotid bruits  Abdomen: soft, non-tender, non-distended, normal bowel sounds, no masses or organomegaly  Extremities: no cyanosis, clubbing or edema  Musculoskeletal: normal range of motion, no joint swelling, deformity or tenderness  Neurologic: reflexes normal and symmetric, no cranial nerve deficit, gait, coordination and speech normal       Allergies   Allergen Reactions    Sulfa Antibiotics     Tetanus Toxoid Other (See Comments)     Horse serum     Prior to Visit Medications    Medication Sig Taking? Authorizing Provider   meloxicam (MOBIC) 15 MG tablet Take 1 tablet by mouth daily as needed for Pain Yes Mahendra Goodwin MD   simvastatin (ZOCOR) 40 MG tablet Take 1 tablet by mouth nightly Yes Mahendra Goodwin MD   zoster recombinant adjuvanted vaccine (SHINGRIX) 50 MCG/0.5ML SUSR injection ONE DOSE FOLLOWED BY SECOND DOSE 2-6 MONTHS LATER.    [facility]   docusate sodium (COLACE) 250 MG capsule Take 1 capsule by mouth daily Yes Historical Provider, MD [place] (Including outside providers/suppliers regularly involved in providing care):   Patient Care Team:  Mahendra Goodwin MD as PCP - General (Family Medicine)  Mahendra Goodwin MD as PCP - Empaneled Provider      Mahendra Goodwin MD on 07/05/23

## 2023-11-15 PROBLEM — G89.18 ACUTE POST-OPERATIVE PAIN: Status: ACTIVE | Noted: 2023-11-15

## 2023-11-15 PROBLEM — K40.90 INGUINAL HERNIA, RIGHT: Status: ACTIVE | Noted: 2023-11-15

## 2023-11-15 PROBLEM — Z85.46 HISTORY OF PROSTATE CANCER: Status: ACTIVE | Noted: 2021-03-05

## 2023-11-15 PROBLEM — Z85.820 HISTORY OF MELANOMA: Status: ACTIVE | Noted: 2021-03-05

## 2023-11-15 PROBLEM — C43.59 MALIGNANT MELANOMA OF UPPER BACK (MULTI): Status: ACTIVE | Noted: 2023-11-15

## 2023-11-15 PROBLEM — I10 HTN (HYPERTENSION): Status: ACTIVE | Noted: 2023-11-15

## 2023-11-15 PROBLEM — E66.9 OBESITY, CLASS I, BMI 30.0-34.9 (SEE ACTUAL BMI): Status: ACTIVE | Noted: 2023-11-15

## 2023-11-15 PROBLEM — E66.811 OBESITY, CLASS I, BMI 30.0-34.9 (SEE ACTUAL BMI): Status: ACTIVE | Noted: 2023-11-15

## 2023-11-15 PROBLEM — L29.9 EAR ITCHING: Status: ACTIVE | Noted: 2021-03-05

## 2023-11-15 PROBLEM — C43.9 CUTANEOUS MELANOMA (MULTI): Status: ACTIVE | Noted: 2023-11-15

## 2023-11-15 PROBLEM — Z86.19 HISTORY OF HEPATITIS: Status: ACTIVE | Noted: 2021-03-05

## 2023-11-15 PROBLEM — M19.90 ARTHRITIS: Status: ACTIVE | Noted: 2023-11-15

## 2023-11-15 PROBLEM — S43.401A SPRAIN OF SHOULDER, RIGHT: Status: ACTIVE | Noted: 2018-12-01

## 2023-11-15 PROBLEM — E78.5 HYPERLIPIDEMIA: Status: ACTIVE | Noted: 2021-06-16

## 2023-11-15 PROBLEM — M25.531 BILATERAL WRIST PAIN: Status: ACTIVE | Noted: 2022-05-31

## 2023-11-15 PROBLEM — M25.532 BILATERAL WRIST PAIN: Status: ACTIVE | Noted: 2022-05-31

## 2023-11-15 RX ORDER — DOCUSATE SODIUM 250 MG
1 CAPSULE ORAL DAILY
COMMUNITY

## 2023-11-15 RX ORDER — MELOXICAM 15 MG/1
1 TABLET ORAL DAILY PRN
COMMUNITY
Start: 2023-07-05 | End: 2024-07-05

## 2023-11-15 RX ORDER — TERBINAFINE HYDROCHLORIDE 250 MG/1
250 TABLET ORAL
COMMUNITY
Start: 2018-07-19 | End: 2024-05-28 | Stop reason: WASHOUT

## 2023-11-15 RX ORDER — SIMVASTATIN 40 MG/1
1 TABLET, FILM COATED ORAL NIGHTLY
COMMUNITY
Start: 2023-07-05 | End: 2024-07-05

## 2023-12-14 ENCOUNTER — HOSPITAL ENCOUNTER (OUTPATIENT)
Dept: RADIOLOGY | Facility: HOSPITAL | Age: 73
Discharge: HOME | End: 2023-12-14
Payer: MEDICARE

## 2023-12-14 ENCOUNTER — LAB (OUTPATIENT)
Dept: LAB | Facility: LAB | Age: 73
End: 2023-12-14
Payer: MEDICARE

## 2023-12-14 DIAGNOSIS — C43.59 MALIGNANT MELANOMA OF OTHER PART OF TRUNK (MULTI): ICD-10-CM

## 2023-12-14 DIAGNOSIS — C43.59 MALIGNANT MELANOMA OF OTHER PART OF TRUNK (MULTI): Primary | ICD-10-CM

## 2023-12-14 LAB
CREAT SERPL-MCNC: 0.89 MG/DL (ref 0.5–1.3)
GFR SERPL CREATININE-BSD FRML MDRD: >90 ML/MIN/1.73M*2

## 2023-12-14 PROCEDURE — 36415 COLL VENOUS BLD VENIPUNCTURE: CPT

## 2023-12-14 PROCEDURE — 74177 CT ABD & PELVIS W/CONTRAST: CPT | Performed by: RADIOLOGY

## 2023-12-14 PROCEDURE — 2550000001 HC RX 255 CONTRASTS: Performed by: SURGERY

## 2023-12-14 PROCEDURE — 82565 ASSAY OF CREATININE: CPT

## 2023-12-14 PROCEDURE — 71260 CT THORAX DX C+: CPT | Performed by: RADIOLOGY

## 2023-12-14 PROCEDURE — 71260 CT THORAX DX C+: CPT

## 2023-12-14 RX ADMIN — IOHEXOL 75 ML: 350 INJECTION, SOLUTION INTRAVENOUS at 09:07

## 2023-12-19 ENCOUNTER — APPOINTMENT (OUTPATIENT)
Dept: SURGICAL ONCOLOGY | Facility: CLINIC | Age: 73
End: 2023-12-19
Payer: MEDICARE

## 2023-12-19 ENCOUNTER — OFFICE VISIT (OUTPATIENT)
Dept: SURGICAL ONCOLOGY | Facility: CLINIC | Age: 73
End: 2023-12-19
Payer: MEDICARE

## 2023-12-19 VITALS
HEART RATE: 64 BPM | DIASTOLIC BLOOD PRESSURE: 79 MMHG | BODY MASS INDEX: 30.34 KG/M2 | RESPIRATION RATE: 16 BRPM | SYSTOLIC BLOOD PRESSURE: 136 MMHG | OXYGEN SATURATION: 97 % | WEIGHT: 229.94 LBS | TEMPERATURE: 96.8 F

## 2023-12-19 DIAGNOSIS — Z85.820 HISTORY OF MELANOMA: Primary | ICD-10-CM

## 2023-12-19 PROCEDURE — 99213 OFFICE O/P EST LOW 20 MIN: CPT | Performed by: SURGERY

## 2023-12-19 PROCEDURE — 1159F MED LIST DOCD IN RCRD: CPT | Performed by: SURGERY

## 2023-12-19 PROCEDURE — 3078F DIAST BP <80 MM HG: CPT | Performed by: SURGERY

## 2023-12-19 PROCEDURE — 3075F SYST BP GE 130 - 139MM HG: CPT | Performed by: SURGERY

## 2023-12-19 PROCEDURE — 1126F AMNT PAIN NOTED NONE PRSNT: CPT | Performed by: SURGERY

## 2023-12-19 PROCEDURE — 99213 OFFICE O/P EST LOW 20 MIN: CPT | Mod: PO | Performed by: SURGERY

## 2023-12-19 ASSESSMENT — ENCOUNTER SYMPTOMS
LOSS OF SENSATION IN FEET: 0
DEPRESSION: 0
OCCASIONAL FEELINGS OF UNSTEADINESS: 0

## 2023-12-19 ASSESSMENT — PAIN SCALES - GENERAL: PAINLEVEL: 0-NO PAIN

## 2023-12-19 NOTE — PATIENT INSTRUCTIONS
Melanoma Basics  Please make sure you have a follow-up with your dermatologist.  Please make sure you are doing self skin and lymph node exams. This means checking your skin for any new, changing, itching or bleeding spots- especially near your prior melanoma. Also, check for lumps or bumps where your lymph nodes were removed.   Please call our office any time with concerns at [phone].

## 2023-12-19 NOTE — PROGRESS NOTES
Subjective   Patient ID: Luan Pierre is a 73 y.o. male who presents for Follow-up.  Mr. Pierre presented as a 70 year old male referred by Dr. Kirit Ramos for evaluation and management of a right upper back melanoma. The patient reports having a nodule that seemed to increase in size over the past few months. There was no report of trauma to the area, and the patient is uncertain if this arose in a prior mole. He denies bleeding, or pain at the site. He has not noticed any other skin lesions. He saw his Dermatologist Dr Ramos and underwent a biopsy of the right upper back lesion which demonstrated a melanoma with a lack of epidermal attachment raising the possibility of a metastatic lesion. The synoptic report suggested that if this is a primary lesion they would have a Breslow thickness of at least 2.3 mm.]. Total body skin exam was reportedly performed at that time.      Due to the concern that this lesion may be a metastatic melanoma, the patient was evaluated with a CT scan of his chest abdomen and pelvis which did not demonstrate any evidence of distant metastatic disease. The cystic structure in his right pelvis was reevaluated by the reading dermatologist and correlated with a recent laparoscopic right inguinal hernia repair with mesh that was performed in September 2020. As such this appears consistent with a postoperative seroma.     Surgery 1/8/2021 -wide excision of the right upper back melanoma with 2 cm margins, right axillary sentinel lymph node biopsy; right neck excisional biopsy of a pigmented skin lesion  Pathology - residual 4.5 mm Breslow depth tumor on the right upper back, margins clear. 0 of 2 sentinel lymph nodes involved. The right neck lesion was a seborrheic keratosis     12/19/2023 -routine follow-up. The patient presents after having a CT scan that did not identify any evidence of recurrent disease. He is doing well overall. He has been seeing dermatology in [place] and had no  new biopsies recently. He has no new concerns.          Review of Systems   Constitutional:         The patient has very good performance status and is active daily.   Cardiac: No chest pain, palpitations or heart attacks  Pulmonary: No asthma, bronchitis, or COPD  HEENT: No sinus or dental issues.  GI: No constipation, diarrhea, or bloody bowel movements  : no urinary complaints  Musculoskeletal: No limitations to ROM or strength  Skin: Right back melanoma. Prior SCC. Lower back BCC  Heme: No bleeding or thrombosis issues  Lymph: No swollen lymph glands  Psych: No reported anxiety or depression  All other systems reviewed and negative       Objective   Physical Exam  Constitutional:       Comments: General: No acute distress. Healthy appearing  HEENT: Moist oral mucosa, normocephalic  CV: RRR, Vitals reviewed  Pulmonary: No respiratory distress. No use of accessory muscles. No audible wheeze  Lymphatics: No palpable lymphadenopathy in the bilateral axillary and cervical basins.   Skin: Right upper back incision is well healed without evidence of surrounding nodularity or pigmentation to suggest in-transit disease.  Neuro: No gross sensorimotor deficits  Extremities: No arm swelling          Assessment/Plan   Problem List Items Addressed This Visit             ICD-10-CM       Hematology and Neoplasia    History of melanoma Z85.820    Relevant Orders    CT chest abdomen pelvis w IV contrast    Creatinine     - No clinical evidence for recurrent disease.   - Plan for surveillance CT imaging in 6 months  - Follow up when the patient returns from [place]  - Continue to follow with Dermatology in [place]    The patient asked very appropriate questions that were answered to the best of my ability with the current information at hand. He knows to call with any questions or concerns that arise         Yahir Harris MD MPH 12/19/23 10:09 AM

## 2024-04-04 ENCOUNTER — TELEPHONE (OUTPATIENT)
Dept: SURGICAL ONCOLOGY | Facility: HOSPITAL | Age: 74
End: 2024-04-04
Payer: MEDICARE

## 2024-05-09 DIAGNOSIS — E78.5 HYPERLIPIDEMIA, UNSPECIFIED HYPERLIPIDEMIA TYPE: ICD-10-CM

## 2024-05-09 DIAGNOSIS — M25.551 HIP PAIN, ACUTE, RIGHT: ICD-10-CM

## 2024-05-09 RX ORDER — MELOXICAM 15 MG/1
TABLET ORAL
Qty: 90 TABLET | Refills: 3 | OUTPATIENT
Start: 2024-05-09

## 2024-05-09 RX ORDER — SIMVASTATIN 40 MG
TABLET ORAL
Qty: 90 TABLET | Refills: 3 | OUTPATIENT
Start: 2024-05-09

## 2024-05-24 ENCOUNTER — HOSPITAL ENCOUNTER (OUTPATIENT)
Dept: RADIOLOGY | Facility: HOSPITAL | Age: 74
Discharge: HOME | End: 2024-05-24
Payer: MEDICARE

## 2024-05-24 ENCOUNTER — LAB (OUTPATIENT)
Dept: LAB | Facility: LAB | Age: 74
End: 2024-05-24
Payer: MEDICARE

## 2024-05-24 DIAGNOSIS — Z85.820 HISTORY OF MELANOMA: ICD-10-CM

## 2024-05-24 LAB
CREAT SERPL-MCNC: 0.86 MG/DL (ref 0.5–1.3)
EGFRCR SERPLBLD CKD-EPI 2021: >90 ML/MIN/1.73M*2

## 2024-05-24 PROCEDURE — 71260 CT THORAX DX C+: CPT | Performed by: STUDENT IN AN ORGANIZED HEALTH CARE EDUCATION/TRAINING PROGRAM

## 2024-05-24 PROCEDURE — 36415 COLL VENOUS BLD VENIPUNCTURE: CPT

## 2024-05-24 PROCEDURE — 74177 CT ABD & PELVIS W/CONTRAST: CPT | Performed by: STUDENT IN AN ORGANIZED HEALTH CARE EDUCATION/TRAINING PROGRAM

## 2024-05-24 PROCEDURE — 2550000001 HC RX 255 CONTRASTS: Performed by: SURGERY

## 2024-05-24 PROCEDURE — 82565 ASSAY OF CREATININE: CPT

## 2024-05-24 PROCEDURE — 74177 CT ABD & PELVIS W/CONTRAST: CPT

## 2024-05-24 RX ADMIN — IOHEXOL 75 ML: 350 INJECTION, SOLUTION INTRAVENOUS at 10:06

## 2024-05-28 ENCOUNTER — OFFICE VISIT (OUTPATIENT)
Dept: SURGICAL ONCOLOGY | Facility: CLINIC | Age: 74
End: 2024-05-28
Payer: MEDICARE

## 2024-05-28 VITALS
HEART RATE: 63 BPM | WEIGHT: 224.21 LBS | BODY MASS INDEX: 29.58 KG/M2 | DIASTOLIC BLOOD PRESSURE: 72 MMHG | SYSTOLIC BLOOD PRESSURE: 127 MMHG | TEMPERATURE: 97.2 F | OXYGEN SATURATION: 97 % | RESPIRATION RATE: 16 BRPM

## 2024-05-28 DIAGNOSIS — Z85.820 HISTORY OF MELANOMA: Primary | ICD-10-CM

## 2024-05-28 PROCEDURE — 99213 OFFICE O/P EST LOW 20 MIN: CPT | Performed by: SURGERY

## 2024-05-28 PROCEDURE — 1126F AMNT PAIN NOTED NONE PRSNT: CPT | Performed by: SURGERY

## 2024-05-28 PROCEDURE — 3078F DIAST BP <80 MM HG: CPT | Performed by: SURGERY

## 2024-05-28 PROCEDURE — 1159F MED LIST DOCD IN RCRD: CPT | Performed by: SURGERY

## 2024-05-28 PROCEDURE — 3074F SYST BP LT 130 MM HG: CPT | Performed by: SURGERY

## 2024-05-28 ASSESSMENT — PAIN SCALES - GENERAL: PAINLEVEL: 0-NO PAIN

## 2024-05-28 ASSESSMENT — ENCOUNTER SYMPTOMS
DEPRESSION: 0
LOSS OF SENSATION IN FEET: 0
OCCASIONAL FEELINGS OF UNSTEADINESS: 0

## 2024-05-28 NOTE — PROGRESS NOTES
Routine Follow up    Referring Provider:  Kirit Ramos MD    Chief Complaint:  Right back melanoma    History of Present Illness:  This is a 73 y.o. male referred by Dr. Kirit Ramos for evaluation and management of a right upper back melanoma. The patient reports having a nodule that seemed to increase in size over the past few months. There was no report of trauma to the area, and the patient is uncertain if this arose in a prior mole. He denies bleeding, or pain at the site. He has not noticed any other skin lesions. He saw his Dermatologist Dr Ramos and underwent a biopsy of the right upper back lesion which demonstrated a melanoma with a lack of epidermal attachment raising the possibility of a metastatic lesion. The synoptic report suggested that if this is a primary lesion they would have a Breslow thickness of at least 2.3 mm.]. Total body skin exam was reportedly performed at that time.      Due to the concern that this lesion may be a metastatic melanoma, the patient was evaluated with a CT scan of his chest abdomen and pelvis which did not demonstrate any evidence of distant metastatic disease. The cystic structure in his right pelvis was reevaluated by the reading dermatologist and correlated with a recent laparoscopic right inguinal hernia repair with mesh that was performed in September 2020. As such this appears consistent with a postoperative seroma.     Surgery 1/8/2021 -wide excision of the right upper back melanoma with 2 cm margins, right axillary sentinel lymph node biopsy; right neck excisional biopsy of a pigmented skin lesion  Pathology - residual 4.5 mm Breslow depth tumor on the right upper back, margins clear. 0 of 2 sentinel lymph nodes involved. The right neck lesion was a seborrheic keratosis     12/19/2023 -routine follow-up. The patient presents after having a CT scan that did not identify any evidence of recurrent disease. He is doing well overall. He has been  seeing dermatology in [place] and had no new biopsies recently. He has no new concerns.      5/28/2024: CT reviewed showing no evidence of disease. Overall doing well. Seeing dermatology in [place].  Benign biopsies were taken.  No other concerns for malignancy or recurrence.       Review of Systems:  A complete 12 point review of systems was performed and is negative except as noted in the history of present illness.    Vital Signs:  Vitals:    05/28/24 1024   BP: 127/72   Pulse: 63   Resp: 16   Temp: 36.2 °C (97.2 °F)   SpO2: 97%        Physical Exam:  GEN: No acute distress, Healthy appearing  HEENT: Moist mucus membranes, normocephalic  CARDS: RRR  PULM: No respiratory distress  GI: Soft, non-distended  LYMPH: No palpable lymphadenopathy in bilateral cervical, and axillary, and inguinal basins  SKIN: Well-healed biopsy site at the left superior chest.  Right posterior shoulder incision is well healed without evidence of surrounding nodularity or pigmentation.  The patient has 2 seborrheic keratoses on the mid back that he was concerned about however he was reassured by his dermatologist that these are benign.  He also has lesions on bilateral temples that are small slightly raised and plaque-like consistent with keratoses.  Pictures were taken on the patient's phone and he will continue to follow these.  These were not present at the time of his dermatology visit.  NEURO: No gross sensorimotor deficits  EXT: No arm or leg swelling    Laboratory Values:  Lab Results   Component Value Date    WBC 5.1 12/29/2020    HGB 15.8 12/29/2020    HCT 45.1 12/29/2020    MCV 87 12/29/2020     12/29/2020        Chemistry    Lab Results   Component Value Date/Time     12/29/2020 0952    K 4.5 12/29/2020 0952     12/29/2020 0952    CO2 30 12/29/2020 0952    BUN 23 12/29/2020 0952    CREATININE 0.86 05/24/2024 0810    Lab Results   Component Value Date/Time    CALCIUM 9.6 12/29/2020 0952           No results  "found for: \"PR1\"      Imaging:  I have personally reviewed the images and the radiologist's report.  IMPRESSION:  CHEST:  1. Prior right axillary lymph node dissection with no recurrent  enlarged intrathoracic lymphadenopathy. Redemonstrated nonspecific  prominent left axillary lymph nodes likely reactive/benign.  2. No suspicious pulmonary nodules.  3. Moderate coronary artery calcifications.      ABDOMEN-PELVIS:  1. No intraabdominal or pelvic metastatic disease.  2. Stable ill-defined faint sclerotic focus involving the iliac side  of the left sacroiliac joint          MACRO:  None      Signed by: Ede Rose 5/24/2024 1:44 PM    Assessment:  This is a 73 y.o. male with Right back melanoma s/p WLE with SLBx with clear margins.  Lymph nodes were negative.    Plan:  --Continue with surveillance with interval 6 month CT   --Continue to follow with Dermatology for skin checks and evaluations  --Follow up in 6 months can be in person or virtual per the patient's preference. Will consider a 12 month CT thereafter to complete 5 years.   --The patient asked very appropriate questions that were answered to the best of my ability with the current information at hand. He knows to call with any questions or concerns that arise      Yahir Harris MD, MPH      "

## 2024-07-09 ENCOUNTER — OFFICE VISIT (OUTPATIENT)
Dept: FAMILY MEDICINE CLINIC | Age: 74
End: 2024-07-09
Payer: MEDICARE

## 2024-07-09 VITALS
HEIGHT: 73 IN | WEIGHT: 225 LBS | OXYGEN SATURATION: 96 % | RESPIRATION RATE: 16 BRPM | DIASTOLIC BLOOD PRESSURE: 70 MMHG | BODY MASS INDEX: 29.82 KG/M2 | TEMPERATURE: 97.2 F | SYSTOLIC BLOOD PRESSURE: 104 MMHG | HEART RATE: 72 BPM

## 2024-07-09 DIAGNOSIS — E78.5 HYPERLIPIDEMIA, UNSPECIFIED HYPERLIPIDEMIA TYPE: ICD-10-CM

## 2024-07-09 DIAGNOSIS — Z00.00 MEDICARE ANNUAL WELLNESS VISIT, SUBSEQUENT: Primary | ICD-10-CM

## 2024-07-09 DIAGNOSIS — Z85.46 HISTORY OF PROSTATE CANCER: ICD-10-CM

## 2024-07-09 DIAGNOSIS — Z76.0 ENCOUNTER FOR MEDICATION REFILL: ICD-10-CM

## 2024-07-09 DIAGNOSIS — M25.551 HIP PAIN, ACUTE, RIGHT: ICD-10-CM

## 2024-07-09 PROCEDURE — 3017F COLORECTAL CA SCREEN DOC REV: CPT | Performed by: FAMILY MEDICINE

## 2024-07-09 PROCEDURE — G0439 PPPS, SUBSEQ VISIT: HCPCS | Performed by: FAMILY MEDICINE

## 2024-07-09 PROCEDURE — 1123F ACP DISCUSS/DSCN MKR DOCD: CPT | Performed by: FAMILY MEDICINE

## 2024-07-09 RX ORDER — MELOXICAM 15 MG/1
15 TABLET ORAL DAILY PRN
Qty: 90 TABLET | Refills: 3 | Status: SHIPPED
Start: 2024-07-09 | End: 2024-07-09 | Stop reason: SDUPTHER

## 2024-07-09 RX ORDER — MELOXICAM 15 MG/1
15 TABLET ORAL DAILY PRN
Qty: 90 TABLET | Refills: 3 | Status: SHIPPED | OUTPATIENT
Start: 2024-07-09 | End: 2025-07-09

## 2024-07-09 RX ORDER — SIMVASTATIN 40 MG
40 TABLET ORAL NIGHTLY
Qty: 90 TABLET | Refills: 3 | Status: SHIPPED | OUTPATIENT
Start: 2024-07-09 | End: 2025-07-09

## 2024-07-09 RX ORDER — SIMVASTATIN 40 MG
40 TABLET ORAL NIGHTLY
Qty: 90 TABLET | Refills: 3 | Status: SHIPPED
Start: 2024-07-09 | End: 2024-07-09 | Stop reason: SDUPTHER

## 2024-07-09 SDOH — ECONOMIC STABILITY: INCOME INSECURITY: HOW HARD IS IT FOR YOU TO PAY FOR THE VERY BASICS LIKE FOOD, HOUSING, MEDICAL CARE, AND HEATING?: NOT HARD AT ALL

## 2024-07-09 SDOH — ECONOMIC STABILITY: FOOD INSECURITY: WITHIN THE PAST 12 MONTHS, YOU WORRIED THAT YOUR FOOD WOULD RUN OUT BEFORE YOU GOT MONEY TO BUY MORE.: NEVER TRUE

## 2024-07-09 SDOH — ECONOMIC STABILITY: FOOD INSECURITY: WITHIN THE PAST 12 MONTHS, THE FOOD YOU BOUGHT JUST DIDN'T LAST AND YOU DIDN'T HAVE MONEY TO GET MORE.: NEVER TRUE

## 2024-07-09 ASSESSMENT — PATIENT HEALTH QUESTIONNAIRE - PHQ9
SUM OF ALL RESPONSES TO PHQ9 QUESTIONS 1 & 2: 0
SUM OF ALL RESPONSES TO PHQ QUESTIONS 1-9: 0
2. FEELING DOWN, DEPRESSED OR HOPELESS: NOT AT ALL
SUM OF ALL RESPONSES TO PHQ QUESTIONS 1-9: 0
SUM OF ALL RESPONSES TO PHQ QUESTIONS 1-9: 0
1. LITTLE INTEREST OR PLEASURE IN DOING THINGS: NOT AT ALL
SUM OF ALL RESPONSES TO PHQ QUESTIONS 1-9: 0

## 2024-07-09 ASSESSMENT — LIFESTYLE VARIABLES
HOW MANY STANDARD DRINKS CONTAINING ALCOHOL DO YOU HAVE ON A TYPICAL DAY: 1 OR 2
HOW OFTEN DO YOU HAVE A DRINK CONTAINING ALCOHOL: 2-4 TIMES A MONTH

## 2024-07-09 NOTE — TELEPHONE ENCOUNTER
Patient's wife called stating they gave the wrong mail order pharmacy this morning and patient needs his RX to go to [facility].     Last seen 7/9/2024  Next appt Visit date not found    Requested Prescriptions     Pending Prescriptions Disp Refills    meloxicam (MOBIC) 15 MG tablet 90 tablet 3     Sig: Take 1 tablet by mouth daily as needed for Pain    simvastatin (ZOCOR) 40 MG tablet 90 tablet 3     Sig: Take 1 tablet by mouth nightly      [facility]

## 2024-07-09 NOTE — PATIENT INSTRUCTIONS
FAX 3 FOR [facility] TO SEND IMMUNIZATION RECORD:    [phone]      Personalized Preventive Plan for [place] - 7/9/2024  Medicare offers a range of preventive health benefits. Some of the tests and screenings are paid in full while other may be subject to a deductible, co-insurance, and/or copay.    Some of these benefits include a comprehensive review of your medical history including lifestyle, illnesses that may run in your family, and various assessments and screenings as appropriate.    After reviewing your medical record and screening and assessments performed today your provider may have ordered immunizations, labs, imaging, and/or referrals for you.  A list of these orders (if applicable) as well as your Preventive Care list are included within your After Visit Summary for your review.    Other Preventive Recommendations:    A preventive eye exam performed by an eye specialist is recommended every 1-2 years to screen for glaucoma; cataracts, macular degeneration, and other eye disorders.  A preventive dental visit is recommended every 6 months.  Try to get at least 150 minutes of exercise per week or 10,000 steps per day on a pedometer .  Order or download the FREE \"Exercise & Physical Activity: Your Everyday Guide\" from [employer]. Call 1-732.147.1199 or search [employer] online.  You need 6777-7648 mg of calcium and 8036-4024 IU of vitamin D per day. It is possible to meet your calcium requirement with diet alone, but a vitamin D supplement is usually necessary to meet this goal.  When exposed to the sun, use a sunscreen that protects against both UVA and UVB radiation with an SPF of 30 or greater. Reapply every 2 to 3 hours or after sweating, drying off with a towel, or swimming.  Always wear a seat belt when traveling in a car. Always wear a helmet when riding a bicycle or motorcycle.

## 2024-07-09 NOTE — PROGRESS NOTES
Medicare Annual Wellness Visit    [facility] [place] is here for     Chief Complaint   Patient presents with    Medicare AWV        Assessment & Plan     Medicare annual wellness visit, subsequent: Following PSA for history of prostate cancer; medication refills; future lab work    History of prostate cancer  -     PSA, Diagnostic; Future    Encounter for medication refill  -     meloxicam (MOBIC) 15 MG tablet; Take 1 tablet by mouth daily as needed for Pain, Disp-90 tablet, R-37/9/24: DISREGARD PREVIOUS PRESCRIPTIONS AND REFILLS; HONOR THIS PRESCRIPTION AND REFILLSNormal  -     simvastatin (ZOCOR) 40 MG tablet; Take 1 tablet by mouth nightly, Disp-90 tablet, R-37/9/24: DISREGARD PREVIOUS PRESCRIPTIONS AND REFILLS; HONOR THIS PRESCRIPTION AND REFILLSNormal    Hip pain, acute, right  -     meloxicam (MOBIC) 15 MG tablet; Take 1 tablet by mouth daily as needed for Pain, Disp-90 tablet, R-37/9/24: DISREGARD PREVIOUS PRESCRIPTIONS AND REFILLS; HONOR THIS PRESCRIPTION AND REFILLSNormal    Hyperlipidemia, unspecified hyperlipidemia type  -     simvastatin (ZOCOR) 40 MG tablet; Take 1 tablet by mouth nightly, Disp-90 tablet, R-37/9/24: DISREGARD PREVIOUS PRESCRIPTIONS AND REFILLS; HONOR THIS PRESCRIPTION AND REFILLSNormal  -     Comprehensive Metabolic Panel; Future  -     Lipid Panel; Future    Recommendations for Preventive Services Due: see orders and patient instructions/AVS.  Recommended screening schedule for the next 5-10 years is provided to the patient in written form: see Patient Instructions/AVS.    Follow Up:   Return for Medicare Annual Wellness Visit in 1 year.       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  He now carries an Epi Pen    History of prostate cancer; due for yearly monitoring    Patient's complete Health Risk Assessment and screening values have been

## 2024-07-10 DIAGNOSIS — E78.5 HYPERLIPIDEMIA, UNSPECIFIED HYPERLIPIDEMIA TYPE: ICD-10-CM

## 2024-07-10 DIAGNOSIS — Z85.46 HISTORY OF PROSTATE CANCER: ICD-10-CM

## 2024-07-10 LAB
ALBUMIN: 4.2 G/DL (ref 3.5–5.2)
ALP BLD-CCNC: 66 U/L (ref 40–129)
ALT SERPL-CCNC: 15 U/L (ref 0–40)
ANION GAP SERPL CALCULATED.3IONS-SCNC: 10 MMOL/L (ref 7–16)
AST SERPL-CCNC: 20 U/L (ref 0–39)
BILIRUB SERPL-MCNC: 0.4 MG/DL (ref 0–1.2)
BUN BLDV-MCNC: 23 MG/DL (ref 6–23)
CALCIUM SERPL-MCNC: 9.7 MG/DL (ref 8.6–10.2)
CHLORIDE BLD-SCNC: 106 MMOL/L (ref 98–107)
CHOLESTEROL, TOTAL: 178 MG/DL
CO2: 26 MMOL/L (ref 22–29)
CREAT SERPL-MCNC: 0.8 MG/DL (ref 0.7–1.2)
GFR, ESTIMATED: >90 ML/MIN/1.73M2
GLUCOSE BLD-MCNC: 106 MG/DL (ref 74–99)
HDLC SERPL-MCNC: 44 MG/DL
LDL CHOLESTEROL: 111 MG/DL
POTASSIUM SERPL-SCNC: 4.7 MMOL/L (ref 3.5–5)
PROSTATE SPECIFIC ANTIGEN: <0.01 NG/ML (ref 0–4)
SODIUM BLD-SCNC: 142 MMOL/L (ref 132–146)
TOTAL PROTEIN: 7.5 G/DL (ref 6.4–8.3)
TRIGL SERPL-MCNC: 114 MG/DL
VLDLC SERPL CALC-MCNC: 23 MG/DL

## 2024-11-29 ENCOUNTER — APPOINTMENT (OUTPATIENT)
Dept: RADIOLOGY | Facility: HOSPITAL | Age: 74
End: 2024-11-29
Payer: MEDICARE

## 2024-12-03 ENCOUNTER — APPOINTMENT (OUTPATIENT)
Dept: SURGICAL ONCOLOGY | Facility: CLINIC | Age: 74
End: 2024-12-03
Payer: MEDICARE

## 2024-12-16 ENCOUNTER — LAB (OUTPATIENT)
Dept: LAB | Facility: LAB | Age: 74
End: 2024-12-16
Payer: MEDICARE

## 2024-12-16 ENCOUNTER — HOSPITAL ENCOUNTER (OUTPATIENT)
Dept: RADIOLOGY | Facility: HOSPITAL | Age: 74
Discharge: HOME | End: 2024-12-16
Payer: MEDICARE

## 2024-12-16 DIAGNOSIS — Z85.820 HISTORY OF MELANOMA: ICD-10-CM

## 2024-12-16 LAB
CREAT SERPL-MCNC: 0.85 MG/DL (ref 0.5–1.3)
EGFRCR SERPLBLD CKD-EPI 2021: >90 ML/MIN/1.73M*2

## 2024-12-16 PROCEDURE — 2550000001 HC RX 255 CONTRASTS: Performed by: SURGERY

## 2024-12-16 PROCEDURE — 74177 CT ABD & PELVIS W/CONTRAST: CPT

## 2024-12-16 PROCEDURE — 74177 CT ABD & PELVIS W/CONTRAST: CPT | Performed by: STUDENT IN AN ORGANIZED HEALTH CARE EDUCATION/TRAINING PROGRAM

## 2024-12-16 PROCEDURE — 82565 ASSAY OF CREATININE: CPT

## 2024-12-16 PROCEDURE — 71260 CT THORAX DX C+: CPT | Performed by: STUDENT IN AN ORGANIZED HEALTH CARE EDUCATION/TRAINING PROGRAM

## 2024-12-16 PROCEDURE — 36415 COLL VENOUS BLD VENIPUNCTURE: CPT

## 2024-12-17 ENCOUNTER — OFFICE VISIT (OUTPATIENT)
Dept: SURGICAL ONCOLOGY | Facility: CLINIC | Age: 74
End: 2024-12-17
Payer: MEDICARE

## 2024-12-17 VITALS
HEART RATE: 72 BPM | BODY MASS INDEX: 29.76 KG/M2 | RESPIRATION RATE: 16 BRPM | WEIGHT: 225.53 LBS | DIASTOLIC BLOOD PRESSURE: 69 MMHG | TEMPERATURE: 96.3 F | SYSTOLIC BLOOD PRESSURE: 121 MMHG

## 2024-12-17 DIAGNOSIS — Z85.820 HISTORY OF MELANOMA: Primary | ICD-10-CM

## 2024-12-17 PROCEDURE — 99213 OFFICE O/P EST LOW 20 MIN: CPT | Performed by: SURGERY

## 2024-12-17 PROCEDURE — 3074F SYST BP LT 130 MM HG: CPT | Performed by: SURGERY

## 2024-12-17 PROCEDURE — 3078F DIAST BP <80 MM HG: CPT | Performed by: SURGERY

## 2024-12-17 PROCEDURE — 1126F AMNT PAIN NOTED NONE PRSNT: CPT | Performed by: SURGERY

## 2024-12-17 ASSESSMENT — PAIN SCALES - GENERAL: PAINLEVEL_OUTOF10: 0-NO PAIN

## 2024-12-17 NOTE — PROGRESS NOTES
Routine Follow up    Referring Provider:  Kirit Ramos MD    Chief Complaint:  Right back melanoma    History of Present Illness:  This is a 74 y.o. male referred by Dr. Kirit Ramos for evaluation and management of a right upper back melanoma. The patient reports having a nodule that seemed to increase in size over the past few months. There was no report of trauma to the area, and the patient is uncertain if this arose in a prior mole. He denies bleeding, or pain at the site. He has not noticed any other skin lesions. He saw his Dermatologist Dr Ramos and underwent a biopsy of the right upper back lesion which demonstrated a melanoma with a lack of epidermal attachment raising the possibility of a metastatic lesion. The synoptic report suggested that if this is a primary lesion they would have a Breslow thickness of at least 2.3 mm.]. Total body skin exam was reportedly performed at that time.      Due to the concern that this lesion may be a metastatic melanoma, the patient was evaluated with a CT scan of his chest abdomen and pelvis which did not demonstrate any evidence of distant metastatic disease. The cystic structure in his right pelvis was reevaluated by the reading dermatologist and correlated with a recent laparoscopic right inguinal hernia repair with mesh that was performed in September 2020. As such this appears consistent with a postoperative seroma.     Surgery 1/8/2021 -wide excision of the right upper back melanoma with 2 cm margins, right axillary sentinel lymph node biopsy; right neck excisional biopsy of a pigmented skin lesion  Pathology - residual 4.5 mm Breslow depth tumor on the right upper back, margins clear. 0 of 2 sentinel lymph nodes involved. The right neck lesion was a seborrheic keratosis    12/17/2024: CT reviewed, Awaiting final report. Otherwise 3 recent biopsies were performed in [place]. Reports available. No evidence for malignancy. No other concerns  "for malignancy or recurrence.       Review of Systems:  A complete 12 point review of systems was performed and is negative except as noted in the history of present illness.    Vital Signs:  Vitals:    12/17/24 1035   BP: 121/69   Pulse: 72   Resp: 16   Temp: 35.7 °C (96.3 °F)      Physical Exam:  GEN: No acute distress, Healthy appearing  HEENT: Moist mucus membranes, normocephalic  CARDS: RRR  PULM: No respiratory distress  GI: Soft, non-distended  LYMPH: No palpable lymphadenopathy in bilateral cervical, and axillary basins  SKIN: Right posterior shoulder incision is well healed without evidence of surrounding nodularity or pigmentation.  The patient has Recent biopsy scars from non-malignant lesions  NEURO: No gross sensorimotor deficits  EXT: No arm or leg swelling    Laboratory Values:  Lab Results   Component Value Date    WBC 5.1 12/29/2020    HGB 15.8 12/29/2020    HCT 45.1 12/29/2020    MCV 87 12/29/2020     12/29/2020        Chemistry    Lab Results   Component Value Date/Time     12/29/2020 0952    K 4.5 12/29/2020 0952     12/29/2020 0952    CO2 30 12/29/2020 0952    BUN 23 12/29/2020 0952    CREATININE 0.85 12/16/2024 0749    Lab Results   Component Value Date/Time    CALCIUM 9.6 12/29/2020 0952           No results found for: \"PR1\"      Imaging:  I have personally reviewed the images and the radiologist's report.    CT CAP - 12/16/2024 - REPORT PENDING    Assessment:  This is a 74 y.o. male with Right back melanoma s/p WLE with SLBx with clear margins.  Lymph nodes were negative.    Plan:  -- I will contact the patient with the results of his CT scan  --Continue with surveillance with ~12 month CT. (September per patient travel request. This will complete ~5 years of follow up. Further surveillance thereafter will be discussed.   --Continue to follow with Dermatology for skin checks and evaluations  --The patient asked very appropriate questions that were answered to the best of my " ability with the current information at hand. He knows to call with any questions or concerns that arise      Yahir Harris MD, MPH